# Patient Record
Sex: MALE | Race: WHITE | Employment: OTHER | URBAN - METROPOLITAN AREA
[De-identification: names, ages, dates, MRNs, and addresses within clinical notes are randomized per-mention and may not be internally consistent; named-entity substitution may affect disease eponyms.]

---

## 2017-05-05 ENCOUNTER — OFFICE VISIT (OUTPATIENT)
Dept: INTERNAL MEDICINE CLINIC | Age: 62
End: 2017-05-05

## 2017-05-05 VITALS
RESPIRATION RATE: 18 BRPM | BODY MASS INDEX: 25.33 KG/M2 | DIASTOLIC BLOOD PRESSURE: 78 MMHG | HEIGHT: 74 IN | TEMPERATURE: 97.5 F | WEIGHT: 197.38 LBS | SYSTOLIC BLOOD PRESSURE: 127 MMHG | HEART RATE: 58 BPM | OXYGEN SATURATION: 98 %

## 2017-05-05 DIAGNOSIS — Z23 ENCOUNTER FOR IMMUNIZATION: ICD-10-CM

## 2017-05-05 DIAGNOSIS — Z11.59 ENCOUNTER FOR HEPATITIS C SCREENING TEST FOR LOW RISK PATIENT: ICD-10-CM

## 2017-05-05 DIAGNOSIS — Z00.00 PREVENTATIVE HEALTH CARE: Primary | ICD-10-CM

## 2017-05-05 DIAGNOSIS — L57.8 SUN-DAMAGED SKIN: ICD-10-CM

## 2017-05-05 DIAGNOSIS — M75.82 ROTATOR CUFF TENDONITIS, LEFT: ICD-10-CM

## 2017-05-05 RX ORDER — IBUPROFEN 200 MG
400 TABLET ORAL
COMMUNITY

## 2017-05-05 NOTE — PROGRESS NOTES
[facility]  Timeout Progress Note    Chart reviewed for allergies/reaction to any medications. TIMEOUT initiated prior to start of procedure:       Yes No: yes Patient identified by name and date of birth     Yes No: yes Informed consent obtained     Yes No: yes Procedure site marked and verified     Yes No: yes Procedure to be performed verified, patient confirms understanding     Yes No: yes Pain assessment pre-procedure - Pain 0/10     Yes No: yes Pain assessment post-procedure - Pain 0/10     Yes No: yes Patient education provided     Yes No: yes Post-procedure instructions provided to patient     Consent form signed and verified. Patient tolerated procedure well.

## 2017-05-05 NOTE — MR AVS SNAPSHOT
Visit Information Date & Time Provider Department Dept. Phone Encounter #  
 5/5/2017  9:15 AM Blayne Meek MD [facility] [address] [id number] Follow-up Instructions Return in about 1 year (around 5/5/2018). Upcoming Health Maintenance Date Due Hepatitis C Screening 1955 DTaP/Tdap/Td series (1 - Tdap) 11/1/1976 ZOSTER VACCINE AGE 60> 11/1/2015 INFLUENZA AGE 9 TO ADULT 8/1/2017 COLONOSCOPY 11/25/2025 Allergies as of 5/5/2017  Review Complete On: 5/5/2017 By: Blayne Meek MD  
 No Known Allergies Current Immunizations  Reviewed on 5/5/2017 Name Date Tdap  Incomplete Reviewed by Blayne Meek MD on 5/5/2017 at  9:48 AM  
 Reviewed by Blayne Meek MD on 5/5/2017 at  9:48 AM  
You Were Diagnosed With   
  
 Codes Comments Preventative health care    -  Primary ICD-10-CM: Z00.00 ICD-9-CM: V70.0 Encounter for immunization     ICD-10-CM: L98 ICD-9-CM: V03.89 Sun-damaged skin     ICD-10-CM: L57.8 ICD-9-CM: 692.79 Rotator cuff tendonitis, left     ICD-10-CM: M75.82 ICD-9-CM: 726.10 Encounter for hepatitis C screening test for low risk patient     ICD-10-CM: Z11.59 
ICD-9-CM: V73.89 Vitals BP Pulse Temp Resp Height(growth percentile) Weight(growth percentile) 127/78 (BP 1 Location: Left arm, BP Patient Position: Sitting) (!) 58 97.5 °F (36.4 °C) (Oral) 18 6' 1.62\" (1.87 m) 197 lb 6 oz (89.5 kg) SpO2 BMI Smoking Status 98% 25.6 kg/m2 Never Smoker Vitals History BMI and BSA Data Body Mass Index Body Surface Area  
 25.6 kg/m 2 2.16 m 2 Preferred Pharmacy Pharmacy Name Phone [id number] , [address] [place] [address]  [phone] Your Updated Medication List  
  
   
This list is accurate as of: 5/5/17 10:07 AM.  Always use your most recent med list. ADVIL 200 mg tablet Generic drug:  ibuprofen Take  by mouth as needed for Pain. We Performed the Following HEPATITIS C AB [06371 CPT(R)] LIPID PANEL [08265 CPT(R)] METABOLIC PANEL, BASIC [50966 CPT(R)] [place] IMMUNIZ ADMIN,1 SINGLE/COMB VAC/TOXOID [id number] CPT(R)] PROSTATE SPECIFIC AG (PSA) [id number] CPT(R)] REFERRAL TO DERMATOLOGY [REF19 Custom] Comments:  
 . TETANUS, DIPHTHERIA TOXOIDS AND ACELLULAR PERTUSSIS VACCINE (TDAP), IN INDIVIDS. >=7, IM [id number] CPT(R)] Follow-up Instructions Return in about 1 year (around 5/5/2018). Referral Information Referral ID Referred By Referred To  
  
 [id number] Pawel LÓPEZ MD   
   [address] [place] [address] Phone: [phone] Fax: [phone] Visits Status Start Date End Date 1 New Request 5/5/17 5/5/18 If your referral has a status of pending review or denied, additional information will be sent to support the outcome of this decision. Patient Instructions Well Visit, Men 48 to 72: Care Instructions Your Care Instructions Physical exams can help you stay healthy. Your doctor has checked your overall health and may have suggested ways to take good care of yourself. He or she also may have recommended tests. At home, you can help prevent illness with healthy eating, regular exercise, and other steps. Follow-up care is a key part of your treatment and safety. Be sure to make and go to all appointments, and call your doctor if you are having problems. It's also a good idea to know your test results and keep a list of the medicines you take. How can you care for yourself at home? · Reach and stay at a healthy weight. This will lower your risk for many problems, such as obesity, diabetes, heart disease, and high blood pressure. · Get at least 30 minutes of exercise on most days of the week.  Walking is a good choice. You also may want to do other activities, such as running, swimming, cycling, or playing tennis or team sports. · Do not smoke. Smoking can make health problems worse. If you need help quitting, talk to your doctor about stop-smoking programs and medicines. These can increase your chances of quitting for good. · Protect your skin from too much sun. When you're outdoors from 10 a.m. to 4 p.m., stay in the shade or cover up with clothing and a hat with a wide brim. Wear sunglasses that block UV rays. Even when it's cloudy, put broad-spectrum sunscreen (SPF 30 or higher) on any exposed skin. · See a dentist one or two times a year for checkups and to have your teeth cleaned. · Wear a seat belt in the car. · Limit alcohol to 2 drinks a day. Too much alcohol can cause health problems. Follow your doctor's advice about when to have certain tests. These tests can spot problems early. · Cholesterol. Your doctor will tell you how often to have this done based on your overall health and other things that can increase your risk for heart attack and stroke. · Blood pressure. Have your blood pressure checked during a routine doctor visit. Your doctor will tell you how often to check your blood pressure based on your age, your blood pressure results, and other factors. · Prostate exam. Talk to your doctor about whether you should have a blood test (called a PSA test) for prostate cancer. Experts disagree on whether men should have this test. Some experts recommend that you discuss the benefits and risks of the test with your doctor. · Diabetes. Ask your doctor whether you should have tests for diabetes. · Vision. Some experts recommend that you have yearly exams for glaucoma and other age-related eye problems starting at age 48. · Hearing. Tell your doctor if you notice any change in your hearing. You can have tests to find out how well you hear. · Colon cancer. You should begin tests for colon cancer at age 48. You may have one of several tests. Your doctor will tell you how often to have tests based on your age and risk. Risks include whether you already had a precancerous polyp removed from your colon or whether your parent, brother, sister, or child has had colon cancer. · Heart attack and stroke risk. At least every 4 to 6 years, you should have your risk for heart attack and stroke assessed. Your doctor uses factors such as your age, blood pressure, cholesterol, and whether you smoke or have diabetes to show what your risk for a heart attack or stroke is over the next 10 years. · Abdominal aortic aneurysm. Ask your doctor whether you should have a test to check for an aneurysm. You may need a test if you ever smoked or if your parent, brother, sister, or child has had an aneurysm. When should you call for help? Watch closely for changes in your health, and be sure to contact your doctor if you have any problems or symptoms that concern you. Where can you learn more? Go to http://alo-sukhjinder.info/. Enter Y379 in the search box to learn more about \"Well Visit, Men 48 to 72: Care Instructions. \" Current as of: July 19, 2016 Content Version: 11.2 © 3233-6140 [employer]. Care instructions adapted under license by [employer] (which disclaims liability or warranty for this information). If you have questions about a medical condition or this instruction, always ask your healthcare professional. [place] [address] any warranty or liability for your use of this information. Well Visit, Men 48 to 72: Care Instructions Your Care Instructions Physical exams can help you stay healthy. Your doctor has checked your overall health and may have suggested ways to take good care of yourself. He or she also may have recommended tests.  At home, you can help prevent illness with healthy eating, regular exercise, and other steps. Follow-up care is a key part of your treatment and safety. Be sure to make and go to all appointments, and call your doctor if you are having problems. It's also a good idea to know your test results and keep a list of the medicines you take. How can you care for yourself at home? · Reach and stay at a healthy weight. This will lower your risk for many problems, such as obesity, diabetes, heart disease, and high blood pressure. · Get at least 30 minutes of exercise on most days of the week. Walking is a good choice. You also may want to do other activities, such as running, swimming, cycling, or playing tennis or team sports. · Do not smoke. Smoking can make health problems worse. If you need help quitting, talk to your doctor about stop-smoking programs and medicines. These can increase your chances of quitting for good. · Protect your skin from too much sun. When you're outdoors from 10 a.m. to 4 p.m., stay in the shade or cover up with clothing and a hat with a wide brim. Wear sunglasses that block UV rays. Even when it's cloudy, put broad-spectrum sunscreen (SPF 30 or higher) on any exposed skin. · See a dentist one or two times a year for checkups and to have your teeth cleaned. · Wear a seat belt in the car. · Limit alcohol to 2 drinks a day. Too much alcohol can cause health problems. Follow your doctor's advice about when to have certain tests. These tests can spot problems early. · Cholesterol. Your doctor will tell you how often to have this done based on your overall health and other things that can increase your risk for heart attack and stroke. · Blood pressure. Have your blood pressure checked during a routine doctor visit. Your doctor will tell you how often to check your blood pressure based on your age, your blood pressure results, and other factors.  
· Prostate exam. Talk to your doctor about whether you should have a blood test (called a PSA test) for prostate cancer. Experts disagree on whether men should have this test. Some experts recommend that you discuss the benefits and risks of the test with your doctor. · Diabetes. Ask your doctor whether you should have tests for diabetes. · Vision. Some experts recommend that you have yearly exams for glaucoma and other age-related eye problems starting at age 48. · Hearing. Tell your doctor if you notice any change in your hearing. You can have tests to find out how well you hear. · Colon cancer. You should begin tests for colon cancer at age 48. You may have one of several tests. Your doctor will tell you how often to have tests based on your age and risk. Risks include whether you already had a precancerous polyp removed from your colon or whether your parent, brother, sister, or child has had colon cancer. · Heart attack and stroke risk. At least every 4 to 6 years, you should have your risk for heart attack and stroke assessed. Your doctor uses factors such as your age, blood pressure, cholesterol, and whether you smoke or have diabetes to show what your risk for a heart attack or stroke is over the next 10 years. · Abdominal aortic aneurysm. Ask your doctor whether you should have a test to check for an aneurysm. You may need a test if you ever smoked or if your parent, brother, sister, or child has had an aneurysm. When should you call for help? Watch closely for changes in your health, and be sure to contact your doctor if you have any problems or symptoms that concern you. Where can you learn more? Go to http://alo-sukhjinder.info/. Enter P437 in the search box to learn more about \"Well Visit, Men 48 to 72: Care Instructions. \" Current as of: July 19, 2016 Content Version: 11.2 © 5055-0964 [employer].  Care instructions adapted under license by [employer] (which disclaims liability or warranty for this information). If you have questions about a medical condition or this instruction, always ask your healthcare professional. [address] any warranty or liability for your use of this information. Introducing [facility] SERVICES! [facility] introduces [employer] patient portal. Now you can access parts of your medical record, email your doctor's office, and request medication refills online. 1. In your internet browser, go to [email or web address]://[email or web address]. [employer] 2. Click on the First Time User? Click Here link in the Sign In box. You will see the New Member Sign Up page. 3. Enter your [employer] Access Code exactly as it appears below. You will not need to use this code after youve completed the sign-up process. If you do not sign up before the expiration date, you must request a new code. · [employer] Access Code: [email or web address] Expires: 8/3/2017 10:07 AM 
 
4. Enter the last four digits of your Social Security Number (xxxx) and Date of Birth (mm/dd/yyyy) as indicated and click Submit. You will be taken to the next sign-up page. 5. Create a [employer] ID. This will be your [employer] login ID and cannot be changed, so think of one that is secure and easy to remember. 6. Create a [employer] password. You can change your password at any time. 7. Enter your Password Reset Question and Answer. This can be used at a later time if you forget your password. 8. Enter your e-mail address. You will receive e-mail notification when new information is available in [address] 9. Click Sign Up. You can now view and download portions of your medical record. 10. Click the Download Summary menu link to download a portable copy of your medical information. If you have questions, please visit the Frequently Asked Questions section of the [employer] website. Remember, [employer] is NOT to be used for urgent needs. For medical emergencies, dial 911. Now available from your iPhone and Android! Please provide this summary of care documentation to your next provider. Your primary care clinician is listed as Christianne Kong. If you have any questions after today's visit, please call [phone].

## 2017-05-05 NOTE — PROGRESS NOTES
HISTORY OF PRESENT ILLNESS  Sera Sy is a 64 y.o. male. HPI  Sera Sy is here for complete health maintenance physical exam and screening. he does have other concerns. Problem visit:  Sera Sy is here for complaint of L shoulder pain. Problem began 5 month(s) ago. Severity is mild  Character of problem: slid down walking down wet muddy hill playing golf and landed on L side. Reaching up/ back makes the problem worse. nothing makes the problem better. Associated symptoms include: no swelling, weakness  Treatments tried include: medication not used        Health maintenance hx includes:  Exercise: very active. Form of exercise: running   Diet: generally follows a low fat low cholesterol diet, generally follows a low sodium diet, exercises regularly, nonsmoker  retired  Cancer screening:    Colon cancer screening:  Last Colonoscopy: 2015 and was normal   Prostate cancer screening: PSA and/or ELÍAS:   Lab Results   Component Value Date/Time    Prostate Specific Ag 1.1 03/24/2015 11:07 AM          Lab Results   Component Value Date/Time    Cholesterol, total 223 03/24/2015 11:07 AM    HDL Cholesterol 89 03/24/2015 11:07 AM    LDL, calculated 125 03/24/2015 11:07 AM    VLDL, calculated 9 03/24/2015 11:07 AM    Triglyceride 43 03/24/2015 11:07 AM       Lab Results   Component Value Date/Time    Glucose 92 03/24/2015 11:07 AM         Immunizations:     Immunization History   Administered Date(s) Administered    Tdap 05/05/2017      Immunization status: tdap due       Social History     Social History    Marital status:      Spouse name: N/A    Number of children: N/A    Years of education: N/A     Occupational History    Not on file.      Social History Main Topics    Smoking status: Never Smoker    Smokeless tobacco: Not on file    Alcohol use 1.0 - 1.5 oz/week     2 - 3 Standard drinks or equivalent per week    Drug use: No    Sexual activity: Yes     Partners: Female     Other Topics Concern    Not on file     Social History Narrative     Past Surgical History:   Procedure Laterality Date    HX APPENDECTOMY      in 4th grade    HX HEENT Left     opthalmoplegia - repairs    HX ORTHOPAEDIC  1978    R meniscus surgery    HX ORTHOPAEDIC  2015    Trigger Finger - Left     HX OTHER SURGICAL      Multiple eye surgery     Family History   Problem Relation Age of Onset    Sleep Apnea Mother     Sleep Apnea Brother     Cancer Neg Hx     Diabetes Neg Hx     Hypertension Neg Hx      No current outpatient prescriptions on file prior to visit. No current facility-administered medications on file prior to visit. .    Review of Systems   Constitutional: Negative for malaise/fatigue and weight loss. Eyes: Negative for blurred vision and pain. Respiratory: Negative for cough, shortness of breath and wheezing. Cardiovascular: Negative for chest pain, palpitations and leg swelling. Gastrointestinal: Negative for abdominal pain, blood in stool, constipation, diarrhea, heartburn, nausea and vomiting. Genitourinary: Negative. Musculoskeletal: Positive for joint pain. Negative for back pain and myalgias. Skin: Negative for rash. Neurological: Negative for dizziness and headaches. Endo/Heme/Allergies: Negative for environmental allergies. Does not bruise/bleed easily. Psychiatric/Behavioral: Negative for depression. The patient is not nervous/anxious and does not have insomnia. Physical Exam   Constitutional: He is oriented to person, place, and time. He appears well-developed and well-nourished. No distress. Body mass index is 25.6 kg/(m^2). /78 (BP 1 Location: Left arm, BP Patient Position: Sitting)  Pulse (!) 58  Temp 97.5 °F (36.4 °C) (Oral)   Resp 18  Ht 6' 1.62\" (1.87 m)  Wt 197 lb 6 oz (89.5 kg)  SpO2 98%  BMI 25.6 kg/m2   HENT:   Head: Normocephalic and atraumatic.    Right Ear: Hearing, tympanic membrane and ear canal normal.   Left Ear: Hearing, tympanic membrane and ear canal normal.   Nose: Nose normal.   Mouth/Throat: Oropharynx is clear and moist and mucous membranes are normal. Normal dentition. Eyes: Conjunctivae and lids are normal. Pupils are equal, round, and reactive to light. Right eye exhibits no discharge. Left eye exhibits no discharge. No scleral icterus. Neck: Trachea normal. No thyromegaly present. Cardiovascular: Normal rate, regular rhythm, normal heart sounds, intact distal pulses and normal pulses. Exam reveals no gallop and no friction rub. No murmur heard. Pulmonary/Chest: Effort normal and breath sounds normal. No respiratory distress. Abdominal: Soft. Normal appearance and bowel sounds are normal. He exhibits no distension and no mass. There is no hepatosplenomegaly. There is no tenderness. There is no CVA tenderness. Musculoskeletal: Normal range of motion. He exhibits no edema or tenderness. left shoulder exam:    ROM: full range of motion  Crepitus: NO  Shoulder  is not tender to palpation   NEER test: negative  Daily test: negative  Cross over test:  positive  Empty Can test:  negative  Stressed ext rotation:  positive  Stressed int rotation:  negative  Bicep tendon: non-tender     Lymphadenopathy:     He has no cervical adenopathy. Right: No inguinal adenopathy present. Left: No inguinal adenopathy present. Neurological: He is alert and oriented to person, place, and time. Skin: Skin is warm and dry. No rash noted. He is not diaphoretic. Moderate sun damaged skin with typical freckling, scattered nonsuspicous nevi over sun exposed areas. Several small hard whitish lesions on calfs. Psychiatric: He has a normal mood and affect. His speech is normal and behavior is normal. Judgment and thought content normal. Cognition and memory are normal.   Nursing note and vitals reviewed. ASSESSMENT and PLAN  Charly Cain was seen today for well male.     Diagnoses and all orders for this visit:    Preventative health care  -     LIPID PANEL  -     METABOLIC PANEL, [address]  [facility] on age-appropriate/ guideline-based risk prevention behaviors and screening for a 64y.o. year old   male . We also discussed adjustments in screening based on family history if necessary. Printed instructions for preventative screening guidelines and healthy behaviors given to patient with after visit summary. Encounter for immunization  -     Tetanus, diphtheria toxoids and acellular pertussis (TDAP) vaccine, in individuals >=7 years, IM  -     ID IMMUNIZ ADMIN,1 SINGLE/COMB VAC/TOXOID    Sun-damaged skin  -     REFERRAL TO DERMATOLOGY    Rotator cuff tendonitis, left -   -     REFERRAL TO PHYSICAL THERAPY    Encounter for hepatitis C screening test for low risk patient  -     HEPATITIS C AB      Follow-up Disposition:  Return in about 1 year (around 5/5/2018).

## 2017-05-05 NOTE — PATIENT INSTRUCTIONS

## 2017-05-06 LAB
BUN SERPL-MCNC: 13 MG/DL (ref 8–27)
BUN/CREAT SERPL: 14 (ref 10–24)
CALCIUM SERPL-MCNC: 9.1 MG/DL (ref 8.6–10.2)
CHLORIDE SERPL-SCNC: 101 MMOL/L (ref 96–106)
CHOLEST SERPL-MCNC: 213 MG/DL (ref 100–199)
CO2 SERPL-SCNC: 27 MMOL/L (ref 18–29)
CREAT SERPL-MCNC: 0.95 MG/DL (ref 0.76–1.27)
GLUCOSE SERPL-MCNC: 90 MG/DL (ref 65–99)
HCV AB S/CO SERPL IA: 0.1 S/CO RATIO (ref 0–0.9)
HDLC SERPL-MCNC: 80 MG/DL
INTERPRETATION, 910389: NORMAL
LDLC SERPL CALC-MCNC: 122 MG/DL (ref 0–99)
POTASSIUM SERPL-SCNC: 4.7 MMOL/L (ref 3.5–5.2)
PSA SERPL-MCNC: 1.2 NG/ML (ref 0–4)
SODIUM SERPL-SCNC: 142 MMOL/L (ref 134–144)
TRIGL SERPL-MCNC: 56 MG/DL (ref 0–149)
VLDLC SERPL CALC-MCNC: 11 MG/DL (ref 5–40)

## 2017-05-17 ENCOUNTER — HOSPITAL ENCOUNTER (OUTPATIENT)
Dept: PHYSICAL THERAPY | Age: 62
Discharge: HOME | End: 2017-05-17
Payer: COMMERCIAL

## 2017-05-17 PROCEDURE — 97110 THERAPEUTIC EXERCISES: CPT | Performed by: PHYSICAL THERAPIST

## 2017-05-17 PROCEDURE — 97161 PT EVAL LOW COMPLEX 20 MIN: CPT | Performed by: PHYSICAL THERAPIST

## 2017-05-17 NOTE — PROGRESS NOTES
PT INITIAL EVALUATION NOTE 2-15    Patient Name: Paula Dorado  Date:2017  : 1955  [x]  Patient  Verified  Payor: Demario Jasmine / Plan: Hilary Diehl [employer] HMO / Product Type: HMO /    In time:2:05 pm  Out time:3:10 pm  Total Treatment Time (min): 65  Visit #: 1     Treatment Area: Left shoulder pain [M25.512]    SUBJECTIVE  Pain Level (0-10 scale): 2-310  Any medication changes, allergies to medications, adverse drug reactions, diagnosis change, or new procedure performed?: [] No    [x] Yes (see summary sheet for update)  Subjective:     Pt reports that he was walking on a golf course when playing and slipped and fell onto his left shoulder.   PLOF: ***  Mechanism of Injury: ***  Previous Treatment/Compliance: ***  PMHx/Surgical Hx: ***  Work Hx: ***  Living Situation: ***  Pt Goals: ***  Barriers: ***  Motivation: ***  Substance use: ***   FABQ Score: ***  Cognition: A & O x ***        OBJECTIVE/EXAMINATION  Posture:  Bilateral downsloping shoulders with R shoulder lower than L  Other Observations:  Pt is fit and is tall ectomorphic body type  Functional  and Pinch:  ***  Palpation: ***      AROM Shoulder:         R   L  Flexion    132   126  Extension   42   32  Abd/RD   130   126  IR    To stomach  To stomach  ER    52   31      UPPER QUARTER   MUSCLE STRENGTH  KEY       R  L  0 - No Contraction  C1, C2 Neck Flex ***  ***  1 - Trace   C3 Side Flex  ***  ***  2 - Poor   C4 Sh Elev  ***  ***  3 - Fair    C5 Deltoid/Biceps ***  ***  4 - Good   C6 Wrist Ext  ***  ***  5 - Normal   C7 Triceps  ***  ***      C8 Thumb Ext  ***  ***      T1 Hand Inst  ***  ***    MMT: ***  Neurological: Reflexes / Sensations: ***  Special Tests: ***      Modality rationale: {[facility] INMOTION MODALITIES:60602} to improve the patients ability to ***   Min Type Additional Details    [] Estim: []Att   []Unatt        []TENS instruct                  []IFC  []Premod   []NMES                     []Other:  []w/US []w/ice   []w/heat  Position:  Location:    []  Traction: [] Cervical       []Lumbar                       [] Prone          []Supine                       []Intermittent   []Continuous Lbs:  [] before manual  [] after manual  []w/heat    []  Ultrasound: []Continuous   [] Pulsed at:                            []1MHz   []3MHz Location:  W/cm2:    []  Paraffin         Location:  []w/heat    []  Ice     []  Heat  []  Ice massage Position:  Location:    []  Laser  []  Other: Position:  Location:    []  Vasopneumatic Device Pressure:       [] lo [] med [] hi   Temperature:    [x] Skin assessment post-treatment:  [x]intact []redness- no adverse reaction    []redness  adverse reaction:     15 min Therapeutic Exercise:  [x] See flow sheet :   Rationale: increase ROM, increase strength, improve coordination, improve balance and increase proprioception to improve the patients ability to return to N ADL skills            With   [x] TE   [] TA   [] neuro   [] other: Patient Education: [x] Review HEP    [] Progressed/Changed HEP based on:   [] positioning   [] body mechanics   [] transfers   [] heat/ice application    [] other:        Other Objective/Functional Measures: See FOTO scanned into chart    Pain Level (0-10 scale) post treatment: ***      ASSESSMENT:      [x]  See Plan of Care      Herminia Mcwilliams PT, DPT, [facility] 5/17/2017  2:14 PM

## 2017-05-17 NOTE — PROGRESS NOTES
[address] [address], [place], [place] [address]  Phone: [phone]  Fax: [phone]    Plan of Care/ Statement of Necessity for Physical Therapy Services 2-15    Patient name: Carlos Meredith  : 1955  Provider#: [id number]  Referral source: Lucio Moreno MD      Medical/Treatment Diagnosis: Left shoulder pain [M25.512]     Prior Hospitalization: see medical history     Comorbidities: ***  Prior Level of Function: ***  Medications: Verified on Patient Summary List    Start of Care: ***      Onset Date: ***       The Plan of Care and following information is based on the information from the initial evaluation. Assessment/ key information: ***    Evaluation Complexity History {PT OP History :91978}; Examination {PT OP Examination :76216} ; Presentation {PT OP Presentation :32164} ; Clinical Decision Making {PT OP Decision Making :65400}  Overall Complexity Rating: {PT OP Complexity:39185}    Problem List: {[facility] IP PROBLEM LIST:11664}   Treatment Plan may include any combination of the following: {Outpt PT Treatment UEOR:68805}  Patient / Family readiness to learn indicated by: {Outpt PT Patient Family Readiness to Learn:}  Persons(s) to be included in education: {IM Persons Education:33643}  Barriers to Learning/Limitations: {barriers to learning/limitations:58154}  Patient Goal (s): ***  Patient Self Reported Health Status: {Outpt PT Rehabilitation Potential:96437}  Rehabilitation Potential: {Outpt PT Rehabilitation Potential:12160}    Short Term Goals: To be accomplished in *** {[facility] OP WEEKS/TREATMENTS:}:  ***  Long Term Goals: To be accomplished in *** {[facility] OP WEEKS/TREATMENTS:}:  ***  Frequency / Duration: Patient to be seen *** times per week for *** {[facility] OP WEEKS/TREATMENTS:}.     Patient/ Caregiver education and instruction: self care, activity modification, brace/ splint application and exercises    [x]  Plan of care has been reviewed with JASBIR Gramajo, PT, DPT, [facility] 5/17/2017 2:12 PM    ________________________________________________________________________    I certify that the above Therapy Services are being furnished while the patient is under my care. I agree with the treatment plan and certify that this therapy is necessary.     [de-identified] Signature:____________________  Date:____________Time: _________

## 2017-05-31 ENCOUNTER — APPOINTMENT (OUTPATIENT)
Dept: PHYSICAL THERAPY | Age: 62
End: 2017-05-31
Payer: COMMERCIAL

## 2017-06-06 ENCOUNTER — HOSPITAL ENCOUNTER (OUTPATIENT)
Dept: PHYSICAL THERAPY | Age: 62
Discharge: HOME | End: 2017-06-06
Payer: COMMERCIAL

## 2017-06-06 PROCEDURE — 97110 THERAPEUTIC EXERCISES: CPT | Performed by: PHYSICAL THERAPIST

## 2017-06-06 NOTE — PROGRESS NOTES
PT DAILY TREATMENT NOTE 2-15    Patient Name: Handy Shoulder  Date:2017  : 1955  [x]  Patient  Verified  Payor: Raghu Rivero / Plan: Hilary Diehl 77 HMO / Product Type: HMO /    In time:***  Out time:***  Total Treatment Time (min): ***  Visit #: ***     Treatment Area: Left shoulder pain [M25.512]    SUBJECTIVE  Pain Level (0-10 scale): ***  Any medication changes, allergies to medications, adverse drug reactions, diagnosis change, or new procedure performed?: [x] No    [] Yes (see summary sheet for update)  Subjective functional status/changes:   [] No changes reported  ***    OBJECTIVE    Modality rationale: {[facility] INMOTION MODALITIES:80914} to improve the patients ability to ***   Min Type Additional Details    [] Estim: []Att   []Unatt        []TENS instruct                  []IFC  []Premod   []NMES                     []Other:  []w/US   []w/ice   []w/heat  Position:  Location:    []  Traction: [] Cervical       []Lumbar                       [] Prone          []Supine                       []Intermittent   []Continuous Lbs:  [] before manual  [] after manual  []w/heat    []  Ultrasound: []Continuous   [] Pulsed at:                            []1MHz   []3MHz Location:  W/cm2:    []  Paraffin         Location:  []w/heat    []  Ice     []  Heat  []  Ice massage Position:  Location:    []  Laser  []  Other: Position:  Location:    []  Vasopneumatic Device Pressure:       [] lo [] med [] hi   Temperature:    [x] Skin assessment post-treatment:  [x]intact []redness- no adverse reaction    []redness  adverse reaction:     *** min Therapeutic Exercise:  [] See flow sheet :   Rationale: {[facility] IMMOTION THER EX:81046} to improve the patients ability to ***    *** min Therapeutic Activity:  []  See flow sheet :   Rationale: {[facility] IMMOTION THER EX:05530}  to improve the patients ability to ***     *** min Neuromuscular Re-education:  []  See flow sheet :   Rationale: {[facility] IMMOTION THER EX:68026}  to improve the patients ability to ***    *** min Manual Therapy:  ***   Rationale: {[facility] IMMOTION MANUAL THERAPY:62113}  to improve the patients ability to ***    *** min Gait Training:  ___ feet with ___ device on level surfaces with ___ level of assist   Rationale: {[facility] IMMOTION THER EX:85567}  to improve the patients ability to ***          With   [] TE   [] TA   [] neuro   [] other: Patient Education: [x] Review HEP    [] Progressed/Changed HEP based on:   [] positioning   [] body mechanics   [] transfers   [] heat/ice application    [] other:      Other Objective/Functional Measures: ***     Pain Level (0-10 scale) post treatment: ***    ASSESSMENT/Changes in Function:     Patient will continue to benefit from skilled PT services to {[facility] INMOTION ASSESSMENT STATEMENTS:20159} to attain remaining goals.      []  See Plan of Care  []  See progress note/recertification  []  See Discharge Summary         Progress towards goals / Updated goals:  ***    PLAN  []  Upgrade activities as tolerated     []  Continue plan of care  []  Update interventions per flow sheet       []  Discharge due to:_  []  Other:_      Barbara Cabrera, PT 6/6/2017  5:35 PM

## 2017-06-12 ENCOUNTER — APPOINTMENT (OUTPATIENT)
Dept: PHYSICAL THERAPY | Age: 62
End: 2017-06-12
Payer: COMMERCIAL

## 2017-06-27 ENCOUNTER — APPOINTMENT (OUTPATIENT)
Dept: PHYSICAL THERAPY | Age: 62
End: 2017-06-27
Payer: COMMERCIAL

## 2017-07-03 ENCOUNTER — HOSPITAL ENCOUNTER (OUTPATIENT)
Dept: PHYSICAL THERAPY | Age: 62
Discharge: HOME | End: 2017-07-03
Payer: COMMERCIAL

## 2017-07-03 PROCEDURE — 97140 MANUAL THERAPY 1/> REGIONS: CPT | Performed by: PHYSICAL THERAPIST

## 2017-07-03 PROCEDURE — 97110 THERAPEUTIC EXERCISES: CPT | Performed by: PHYSICAL THERAPIST

## 2017-07-03 NOTE — ANCILLARY DISCHARGE INSTRUCTIONS
[address] [facility] [address]  Phone: [phone]  Fax: [phone]    Discharge Summary  2-15    Patient name: Charli Slater  : 1955  Provider#: [id number]  Referral source: Gustavo Clay MD      Medical/Treatment Diagnosis: Left shoulder pain [M25.512]     Prior Hospitalization: see medical history     Comorbidities: See Plan of Care  Prior Level of Function:See Plan of Care  Medications: Verified on Patient Summary List    Start of Care: 2017     Onset Date:2016   Visits from Start of Care: 3     Missed Visits: 0  Reporting Period : 2017 to 7/3/2017      ASSESSMENT/SUMMARY OF CARE: Pt has progressed well with HEP and with demonstsration of ADL skills without compensation or pain present. Pt will benefit from continued demonstration and completion of his HEP and will f/u with PT PRN. Goal: Pt will demo AROM bilateral shoulder WNL to allow ADL performance without compensation  Status at last note/certification:  Status at discharge: met    Goal:Pt will demo seated posture with no v.c. And no pain present.   Status at last note/certification:  Status at discharge: met    Goal: Pt will demo all work duties, all exercise performance and all ADL/IADL skills without compensation  Status at last note/certification:  Status at discharge: met        RECOMMENDATIONS:  [x]Discontinue therapy: [x]Patient has reached or is progressing toward set goals      []Patient is non-compliant or has abdicated      []Due to lack of appreciable progress towards set goals      []Other    Rafael Guzmán, PT, DPT, [facility]  7/3/2017  11:48 AM

## 2017-07-03 NOTE — PROGRESS NOTES
PT DAILY TREATMENT NOTE 2-15    Patient Name: Fabricio Doyle  Date:7/3/2017  : 1955  [x]  Patient  Verified  Payor: [employer] [facility] [employer] / Plan: Hilary Wilkes HMO / Product Type: HMO /    In time:11:05 am  Out time:11:50 AM  Total Treatment Time (min): 45  Visit #: 3     Treatment Area: Left shoulder pain [M25.512]    SUBJECTIVE  Pain Level (0-10 scale): 2-4/10  Any medication changes, allergies to medications, adverse drug reactions, diagnosis change, or new procedure performed?: [x] No    [] Yes (see summary sheet for update)  Subjective functional status/changes:   [] No changes reported  Pt reports that he has been getting to his HEP about 3-4 times per week. He has been doing generalized exercise like biking, running and gym working out. He reports that his pain is greatest when he is sleeping on his shoulder. OBJECTIVE    30 min Therapeutic Exercise:  [x] See flow sheet :   Rationale: increase ROM, increase strength, improve coordination, improve balance and increase proprioception to improve the patients ability to return to N ADL skills and IADL skills    15 min Manual Therapy:  A/P and Inf glides L gh jt gd III and IV; TFM to left supraspinatus attachment   Rationale: decrease pain, increase ROM and decrease trigger points  to improve the patients ability to reach overhead without impingement pain          With   [x] TE   [] TA   [] neuro   [] other: Patient Education: [x] Review HEP    [x] Progressed/Changed HEP based on:   [] positioning   [] body mechanics   [] transfers   [] heat/ice application    [] other:      Other Objective/Functional Measures: AROM bilateral shoulder WNL bilaterally with min end range pain left shoulder     Pain Level (0-10 scale) post treatment: 3/10 with activity.     ASSESSMENT/Changes in Function:     Patient will continue to benefit from skilled PT services to modify and progress therapeutic interventions, address functional mobility deficits, address ROM deficits, address strength deficits, analyze and address soft tissue restrictions, analyze and cue movement patterns, analyze and modify body mechanics/ergonomics, assess and modify postural abnormalities, address imbalance/dizziness and instruct in home and community integration to attain remaining goals. []  See Plan of Care  []  See progress note/recertification  []  See Discharge Summary         Progress towards goals / Updated goals:  Pt has progressed with HEP and is able to do most ADL skills without limitation.     PLAN  []  Upgrade activities as tolerated     [x]  Continue plan of care  []  Update interventions per flow sheet       [x]  Discharge due to: independence with HEP  []  Other:_      Lilli Vernon, PT, DPT, [facility]  7/3/2017  11:09 AM

## 2018-03-09 ENCOUNTER — TELEPHONE (OUTPATIENT)
Dept: INTERNAL MEDICINE CLINIC | Age: 63
End: 2018-03-09

## 2018-03-09 NOTE — TELEPHONE ENCOUNTER
Ref Request :        Dr Jacquelyn Brown at [id number] [address] - we ref'd patient to Dr Pilar Duffy who then sent patient to Dr Amy Garcia , biopsy showed squamous cell carcinoma     Apt: 04/13/2018 at 10:30am  Phone [phone] fax : [phone] atn: Tameka Giles.   Patient states he's going to need multiple visit authorized            Sinan Rodriguez [phone]

## 2018-03-15 NOTE — TELEPHONE ENCOUNTER
Per patient's insurance no referrals required. [employer] on file @ time of request.   Reached out to specialist office to advise.  Spoke with Srikanth

## 2018-05-08 ENCOUNTER — OFFICE VISIT (OUTPATIENT)
Dept: INTERNAL MEDICINE CLINIC | Age: 63
End: 2018-05-08

## 2018-05-08 VITALS
HEIGHT: 74 IN | WEIGHT: 194.5 LBS | BODY MASS INDEX: 24.96 KG/M2 | TEMPERATURE: 98.7 F | RESPIRATION RATE: 18 BRPM | OXYGEN SATURATION: 99 % | DIASTOLIC BLOOD PRESSURE: 74 MMHG | SYSTOLIC BLOOD PRESSURE: 134 MMHG | HEART RATE: 64 BPM

## 2018-05-08 DIAGNOSIS — Z00.00 PREVENTATIVE HEALTH CARE: Primary | ICD-10-CM

## 2018-05-08 RX ORDER — LORATADINE 10 MG/1
10 TABLET ORAL AS NEEDED
COMMUNITY
End: 2018-05-08

## 2018-05-08 NOTE — PROGRESS NOTES
HISTORY OF PRESENT ILLNESS  Manda Meckel is a 58 y.o. male. HPI  Manda Meckel is here for complete health maintenance physical exam and screening. he does not have other concerns. Health maintenance hx includes:  Exercise: very active. Form of exercise: running, wts, golf   Diet: generally follows a low fat low cholesterol diet, exercises regularly, nonsmoker  [facility]  for [place] [address] screening:    Colon cancer screening:  Last Colonoscopy: 2015 and was normal   Prostate cancer screening: PSA and/or ELÍAS:   Lab Results   Component Value Date/Time    Prostate Specific Ag 1.2 05/05/2017 10:43 AM    Prostate Specific Ag 1.1 03/24/2015 11:07 AM          Lab Results   Component Value Date/Time    Cholesterol, total 213 (H) 05/05/2017 10:43 AM    HDL Cholesterol 80 05/05/2017 10:43 AM    LDL, calculated 122 (H) 05/05/2017 10:43 AM    VLDL, calculated 11 05/05/2017 10:43 AM    Triglyceride 56 05/05/2017 10:43 AM       Lab Results   Component Value Date/Time    Glucose 90 05/05/2017 10:43 AM         Immunizations:     Immunization History   Administered Date(s) Administered    Tdap 05/05/2017      Immunization status: up to date and documented. Social History     Social History    Marital status:      Spouse name: N/A    Number of children: N/A    Years of education: N/A     Occupational History    Not on file.      Social History Main Topics    Smoking status: Never Smoker    Smokeless tobacco: Never Used    Alcohol use 3.0 - 3.6 oz/week     2 Glasses of wine, 1 Cans of beer, 2 - 3 Standard drinks or equivalent per week    Drug use: No    Sexual activity: Yes     Partners: Female     Other Topics Concern    Not on file     Social History Narrative     Past Surgical History:   Procedure Laterality Date    HX APPENDECTOMY      in 4th grade    HX HEENT Left     opthalmoplegia - repairs    HX ORTHOPAEDIC  1978    R meniscus surgery    HX ORTHOPAEDIC  2015    Trigger Finger - Left     HX OTHER SURGICAL      Multiple eye surgery     Family History   Problem Relation Age of Onset    Sleep Apnea Mother     Heart Disease Father     Sleep Apnea Brother     Cancer Neg Hx     Diabetes Neg Hx     Hypertension Neg Hx      Current Outpatient Prescriptions on File Prior to Visit   Medication Sig Dispense Refill    ibuprofen (ADVIL) 200 mg tablet Take  by mouth as needed for Pain. No current facility-administered medications on file prior to visit. .    Review of Systems   Constitutional: Negative for malaise/fatigue and weight loss. Eyes: Negative for blurred vision and pain. Respiratory: Negative for cough, shortness of breath and wheezing. Cardiovascular: Negative for chest pain, palpitations and leg swelling. Gastrointestinal: Negative for blood in stool, constipation, diarrhea, heartburn, nausea and vomiting. Genitourinary: Negative. Musculoskeletal: Negative for back pain, joint pain and myalgias. Skin: Negative for rash. Sun damaged skin with actinic keratosis and R thigh BCC followed by DR. Jatin Baltazar. Planning Mohs on R thigh with plastic surgeon. ? Blue light treatment vs cream for face   Neurological: Negative for dizziness and headaches. Endo/Heme/Allergies: Positive for environmental allergies (recent congestion - took claritin 2 days and resolved). Does not bruise/bleed easily. Psychiatric/Behavioral: Negative for depression. The patient is not nervous/anxious and does not have insomnia. Physical Exam   Constitutional: He is oriented to person, place, and time. He appears well-developed and well-nourished. No distress. Body mass index is 25.23 kg/(m^2). /74 (BP 1 Location: Left arm, BP Patient Position: Sitting)  Pulse 64  Temp 98.7 °F (37.1 °C) (Oral)   Resp 18  Ht 6' 1.62\" (1.87 m)  Wt 194 lb 8 oz (88.2 kg)  SpO2 99%  BMI 25.23 kg/m2   HENT:   Head: Normocephalic and atraumatic.    Right Ear: Hearing, tympanic membrane and ear canal normal.   Left Ear: Hearing, tympanic membrane and ear canal normal.   Nose: Nose normal.   Mouth/Throat: Oropharynx is clear and moist and mucous membranes are normal. Normal dentition. Eyes: Conjunctivae and lids are normal. Pupils are equal, round, and reactive to light. Right eye exhibits no discharge. Left eye exhibits no discharge. No scleral icterus. Neck: Trachea normal. No thyromegaly present. Cardiovascular: Normal rate, regular rhythm, normal heart sounds, intact distal pulses and normal pulses. Exam reveals no gallop and no friction rub. No murmur heard. Pulmonary/Chest: Effort normal and breath sounds normal. No respiratory distress. Abdominal: Soft. Normal appearance and bowel sounds are normal. He exhibits no distension and no mass. There is no hepatosplenomegaly. There is no tenderness. There is no CVA tenderness. Musculoskeletal: Normal range of motion. He exhibits no edema or tenderness. Lymphadenopathy:     He has no cervical adenopathy. Right: No inguinal adenopathy present. Left: No inguinal adenopathy present. Neurological: He is alert and oriented to person, place, and time. Skin: Skin is warm and dry. No rash noted. He is not diaphoretic. Diffuse freckling, sun damaged skin over upper back, chest, forearms, , with patches of dry rough erythema on face, forehead c/w actinic keratosis     Psychiatric: He has a normal mood and affect. His speech is normal and behavior is normal. Judgment and thought content normal. Cognition and memory are normal.   Nursing note and vitals reviewed. ASSESSMENT and PLAN  Diagnoses and all orders for this visit:    1. Preventative health care  -     PROSTATE SPECIFIC AG  -     LIPID PANEL  -     METABOLIC PANEL, BASIC  [facility] was counseled on age-appropriate/ guideline-based risk prevention behaviors and screening for a 58y.o. year old   male . We also discussed adjustments in screening based on family history if necessary. Printed instructions for preventative screening guidelines and healthy behaviors given to patient with after visit summary.           Follow-up Disposition: Not on File

## 2018-05-08 NOTE — PATIENT INSTRUCTIONS

## 2018-05-08 NOTE — MR AVS SNAPSHOT
[facility] 
 
 
 [address] [place] [address] Road 
[phone] Patient: Jeffery Tapia MRN: [id number] [id number] Visit Information Date & Time Provider Department Dept. Phone Encounter #  
 5/8/2018  9:15 AM Jon Gonzales MD [facility] [address] [id number] Upcoming Health Maintenance Date Due ZOSTER VACCINE AGE 60> 9/1/2015 Influenza Age 5 to Adult 8/1/2018 COLONOSCOPY 11/25/2025 DTaP/Tdap/Td series (2 - Td) 5/5/2027 Allergies as of 5/8/2018  Review Complete On: 5/8/2018 By: Jon Gonzales MD  
 No Known Allergies Current Immunizations  Reviewed on 5/8/2018 Name Date Tdap 5/5/2017 Reviewed by Jon Gonzales MD on 5/8/2018 at  9:55 AM  
 Reviewed by Jon Gonzales MD on 5/8/2018 at  9:55 AM  
You Were Diagnosed With   
  
 Codes Comments Preventative health care    -  Primary ICD-10-CM: Z00.00 ICD-9-CM: V70.0 Vitals BP Pulse Temp Resp Height(growth percentile) Weight(growth percentile) 134/74 (BP 1 Location: Left arm, BP Patient Position: Sitting) 64 98.7 °F (37.1 °C) (Oral) 18 6' 1.62\" (1.87 m) 194 lb 8 oz (88.2 kg) SpO2 BMI Smoking Status 99% 25.23 kg/m2 Never Smoker Vitals History BMI and BSA Data Body Mass Index Body Surface Area  
 25.23 kg/m 2 2.14 m 2 Preferred Pharmacy Pharmacy Name Phone [id number] [phone] [facility], [address] [place] [address]  [phone] Your Updated Medication List  
  
   
This list is accurate as of 5/8/18 10:08 AM.  Always use your most recent med list. ADVIL 200 mg tablet Generic drug:  ibuprofen Take  by mouth as needed for Pain. We Performed the Following LIPID PANEL [68609 CPT(R)] METABOLIC PANEL, BASIC [21472 CPT(R)] PSA, DIAGNOSTIC (PROSTATE SPECIFIC AG) [id number] CPT(R)] Patient Instructions Well Visit, Men 48 to 72: Care Instructions Your Care Instructions Physical exams can help you stay healthy. Your doctor has checked your overall health and may have suggested ways to take good care of yourself. He or she also may have recommended tests. At home, you can help prevent illness with healthy eating, regular exercise, and other steps. Follow-up care is a key part of your treatment and safety. Be sure to make and go to all appointments, and call your doctor if you are having problems. It's also a good idea to know your test results and keep a list of the medicines you take. How can you care for yourself at home? · Reach and stay at a healthy weight. This will lower your risk for many problems, such as obesity, diabetes, heart disease, and high blood pressure. · Get at least 30 minutes of exercise on most days of the week. Walking is a good choice. You also may want to do other activities, such as running, swimming, cycling, or playing tennis or team sports. · Do not smoke. Smoking can make health problems worse. If you need help quitting, talk to your doctor about stop-smoking programs and medicines. These can increase your chances of quitting for good. · Protect your skin from too much sun. When you're outdoors from 10 a.m. to 4 p.m., stay in the shade or cover up with clothing and a hat with a wide brim. Wear sunglasses that block UV rays. Even when it's cloudy, put broad-spectrum sunscreen (SPF 30 or higher) on any exposed skin. · See a dentist one or two times a year for checkups and to have your teeth cleaned. · Wear a seat belt in the car. · Limit alcohol to 2 drinks a day. Too much alcohol can cause health problems. Follow your doctor's advice about when to have certain tests. These tests can spot problems early. · Cholesterol. Your doctor will tell you how often to have this done based on your overall health and other things that can increase your risk for heart attack and stroke. · Blood pressure. Have your blood pressure checked during a routine doctor visit. Your doctor will tell you how often to check your blood pressure based on your age, your blood pressure results, and other factors. · Prostate exam. Talk to your doctor about whether you should have a blood test (called a PSA test) for prostate cancer. Experts disagree on whether men should have this test. Some experts recommend that you discuss the benefits and risks of the test with your doctor. · Diabetes. Ask your doctor whether you should have tests for diabetes. · Vision. Some experts recommend that you have yearly exams for glaucoma and other age-related eye problems starting at age 48. · Hearing. Tell your doctor if you notice any change in your hearing. You can have tests to find out how well you hear. · Colon cancer. You should begin tests for colon cancer at age 48. You may have one of several tests. Your doctor will tell you how often to have tests based on your age and risk. Risks include whether you already had a precancerous polyp removed from your colon or whether your parent, brother, sister, or child has had colon cancer. · Heart attack and stroke risk. At least every 4 to 6 years, you should have your risk for heart attack and stroke assessed. Your doctor uses factors such as your age, blood pressure, cholesterol, and whether you smoke or have diabetes to show what your risk for a heart attack or stroke is over the next 10 years. · Abdominal aortic aneurysm. Ask your doctor whether you should have a test to check for an aneurysm. You may need a test if you ever smoked or if your parent, brother, sister, or child has had an aneurysm. When should you call for help? Watch closely for changes in your health, and be sure to contact your doctor if you have any problems or symptoms that concern you. Where can you learn more? Go to http://alo-sukhjinder.info/. Enter I123 in the search box to learn more about \"Well Visit, Men 48 to 72: Care Instructions. \" Current as of: May 12, 2017 Content Version: 11.4 © 9478-4953 Healthwise, Incorporated. Care instructions adapted under license by [employer] (which disclaims liability or warranty for this information). If you have questions about a medical condition or this instruction, always ask your healthcare professional. [place] [address] any warranty or liability for your use of this information. Introducing [facility]! Chandni Romero introduces [employer] patient portal. Now you can access parts of your medical record, email your doctor's office, and request medication refills online. 1. In your internet browser, go to [email or web address] 2. Click on the First Time User? Click Here link in the Sign In box. You will see the New Member Sign Up page. 3. Enter your [employer] Access Code exactly as it appears below. You will not need to use this code after youve completed the sign-up process. If you do not sign up before the expiration date, you must request a new code. · [employer] Access Code: [email or web address] Expires: 8/6/2018 10:07 AM 
 
4. Enter the last four digits of your Social Security Number (xxxx) and Date of Birth (mm/dd/yyyy) as indicated and click Submit. You will be taken to the next sign-up page. 5. Create a [employer] ID. This will be your [employer] login ID and cannot be changed, so think of one that is secure and easy to remember. 6. Create a [employer] password. You can change your password at any time. 7. Enter your Password Reset Question and Answer. This can be used at a later time if you forget your password. 8. Enter your e-mail address. You will receive e-mail notification when new information is available in [address] 9. Click Sign Up. You can now view and download portions of your medical record. 10. Click the Download Summary menu link to download a portable copy of your medical information. If you have questions, please visit the Frequently Asked Questions section of the [employer] website. Remember, [employer] is NOT to be used for urgent needs. For medical emergencies, dial 911. Now available from your iPhone and Android! Please provide this summary of care documentation to your next provider. Your primary care clinician is listed as Montez Irving. If you have any questions after today's visit, please call [phone].

## 2018-05-09 LAB
BUN SERPL-MCNC: 15 MG/DL (ref 8–27)
BUN/CREAT SERPL: 15 (ref 10–24)
CALCIUM SERPL-MCNC: 9.6 MG/DL (ref 8.6–10.2)
CHLORIDE SERPL-SCNC: 99 MMOL/L (ref 96–106)
CHOLEST SERPL-MCNC: 224 MG/DL (ref 100–199)
CO2 SERPL-SCNC: 29 MMOL/L (ref 18–29)
CREAT SERPL-MCNC: 0.98 MG/DL (ref 0.76–1.27)
GFR SERPLBLD CREATININE-BSD FMLA CKD-EPI: 82 ML/MIN/1.73
GFR SERPLBLD CREATININE-BSD FMLA CKD-EPI: 95 ML/MIN/1.73
GLUCOSE SERPL-MCNC: 94 MG/DL (ref 65–99)
HDLC SERPL-MCNC: 79 MG/DL
INTERPRETATION, 910389: NORMAL
LDLC SERPL CALC-MCNC: 135 MG/DL (ref 0–99)
POTASSIUM SERPL-SCNC: 4.6 MMOL/L (ref 3.5–5.2)
PSA SERPL-MCNC: 1.5 NG/ML (ref 0–4)
SODIUM SERPL-SCNC: 140 MMOL/L (ref 134–144)
TRIGL SERPL-MCNC: 49 MG/DL (ref 0–149)
VLDLC SERPL CALC-MCNC: 10 MG/DL (ref 5–40)

## 2019-10-29 ENCOUNTER — OFFICE VISIT (OUTPATIENT)
Dept: INTERNAL MEDICINE CLINIC | Age: 64
End: 2019-10-29

## 2019-10-29 VITALS
HEART RATE: 54 BPM | OXYGEN SATURATION: 98 % | DIASTOLIC BLOOD PRESSURE: 78 MMHG | RESPIRATION RATE: 14 BRPM | BODY MASS INDEX: 25.54 KG/M2 | TEMPERATURE: 97.9 F | WEIGHT: 199 LBS | HEIGHT: 74 IN | SYSTOLIC BLOOD PRESSURE: 122 MMHG

## 2019-10-29 DIAGNOSIS — Z00.00 ROUTINE PHYSICAL EXAMINATION: Primary | ICD-10-CM

## 2019-10-29 DIAGNOSIS — Z23 ENCOUNTER FOR IMMUNIZATION: ICD-10-CM

## 2019-10-29 DIAGNOSIS — Z82.49 FAMILY HISTORY OF CORONARY ARTERY DISEASE: ICD-10-CM

## 2019-10-29 NOTE — PROGRESS NOTES
Declan Souza is a 61 y.o. male who was seen in clinic today (10/29/2019) for a full physical.  He is a new patient today, previously followed by Dr Tamar Whitman. He is  for [facility]. Goes by [address]. Assessment & Plan:   Diagnoses and all orders for this visit:    1. Routine physical examination  -     AMB POC EKG ROUTINE W/ 12 LEADS, INTER & REP  -     METABOLIC PANEL, COMPREHENSIVE  -     CBC W/O DIFF  -     LIPID PANEL  -     PSA, DIAGNOSTIC (PROSTATE SPECIFIC AG)    2. Encounter for immunization  -     GA IMMUNIZ ADMIN,1 SINGLE/COMB VAC/TOXOID  -     INFLUENZA VIRUS VAC QUAD,SPLIT,PRESV FREE SYRINGE IM  -     varicella-zoster recombinant, PF, (SHINGRIX, PF,) 50 mcg/0.5 mL susr injection; 0.5 ml IM once and then repeat in 2-6 months. 3. Family history of coronary artery disease- reviewed labs, reviewed pros/cons to testing below, will order to help with risk stratification   -     CT HEART W/O CONT WITH CALCIUM; Future       Weight is stable & appropriate, I have recommended the following interventions: encourage exercise and lifestyle education regarding diet.        Follow-up and Dispositions    · Follow up - 1 year for CPE (FULL PHYSICAL)         ------------------------------------------------------------------------------------------    Subjective:   Jered Merchant is here today for a full physical.      Health Maintenance  Immunizations:    Influenza: he will get this done today   Tetanus: up to date   Shingles: due for Shingrix - script provided   Pneumonia: n/a   Cancer screening:     Prostate: guidelines reviewed, will do today   Colon: guidelines reviewed, up to date      Patient Care Team:  Muna Potts MD as PCP - General (Internal Medicine)  Lidia Leach MD as PCP - [facility] Provider  Beatriz Edmond MD (Gastroenterology)  Sintia Rapp MD (Ophthalmology)  Dean Moran MD (Dermatology)  Lex Sandy MD (Orthopedic Surgery)       The following sections were reviewed & updated as appropriate: PMH, PSH, FH, and SH. Patient Active Problem List   Diagnosis Code    OA (osteoarthritis) M19.90          Prior to Admission medications    Medication Sig Start Date End Date Taking? Authorizing Provider   ibuprofen (ADVIL) 200 mg tablet Take  by mouth as needed for Pain. Yes Provider, Historical          No Known Allergies          Review of Systems   Constitutional: Negative for chills and fever. Respiratory: Negative for cough and shortness of breath. Cardiovascular: Negative for chest pain and palpitations. Gastrointestinal: Negative for abdominal pain, blood in stool, constipation, diarrhea, heartburn, nausea and vomiting. Genitourinary:        He denies: nocturia, urinary hesitancy, urinary frequency, weak stream.       Denies trouble getting or maintaining an erection. Denies trouble with AM erections. Musculoskeletal: Positive for joint pain (fingers, on/off). Negative for myalgias. Neurological: Negative for tingling, focal weakness and headaches. Endo/Heme/Allergies: Does not bruise/bleed easily. Psychiatric/Behavioral: Negative for depression. The patient is not nervous/anxious and does not have insomnia. Objective:   Physical Exam   Constitutional: He appears well-developed. No distress. HENT:   Right Ear: Tympanic membrane, external ear and ear canal normal.   Left Ear: Tympanic membrane, external ear and ear canal normal.   Nose: Nose normal.   Mouth/Throat: Uvula is midline, oropharynx is clear and moist and mucous membranes are normal. No posterior oropharyngeal erythema. Eyes: Conjunctivae and lids are normal. No scleral icterus. Neck: Neck supple. No thyromegaly present. Cardiovascular: Regular rhythm and normal heart sounds. Bradycardia present. No murmur heard. Pulses:       Dorsalis pedis pulses are 2+ on the right side, and 2+ on the left side.         Posterior tibial pulses are 2+ on the right side, and 2+ on the left side. Pulmonary/Chest: Effort normal and breath sounds normal. He has no wheezes. He has no rales. Abdominal: Soft. Bowel sounds are normal. He exhibits no mass. There is no hepatosplenomegaly. There is no tenderness. Musculoskeletal: He exhibits no edema. Cervical back: Normal.        Thoracic back: He exhibits no bony tenderness. Lumbar back: Normal.   Lymphadenopathy:     He has no cervical adenopathy. Neurological: He has normal strength. No sensory deficit. Skin: No rash noted. Psychiatric: He has a normal mood and affect. His behavior is normal.          Visit Vitals  /78   Pulse (!) 54   Temp 97.9 °F (36.6 °C) (Oral)   Resp 14   Ht 6' 1.8\" (1.875 m)   Wt 199 lb (90.3 kg)   SpO2 98%   BMI 25.69 kg/m²          Advised him to call back or return to office if symptoms worsen/change/persist.  Discussed expected course/resolution/complications of diagnosis in detail with patient. Medication risks/benefits/costs/interactions/alternatives discussed with patient. He was given an after visit summary which includes diagnoses, current medications, & vitals. He expressed understanding with the diagnosis and plan. Aspects of this note may have been generated using voice recognition software. Despite editing, there may be some syntax errors.        Kelby Edge MD

## 2019-10-29 NOTE — PROGRESS NOTES
Establish care. Not sure if has AMD.     Meagan Fraser is a 61 y.o. male  who present for routine immunizations. he  denies any symptoms , reactions or allergies that would exclude them from being immunized today. Risks and adverse reactions were discussed and the VIS was given to them. All questions were addressed. he was observed for 5 min post injection. There were no reactions observed.     Bryson Johnson RN

## 2019-10-29 NOTE — PATIENT INSTRUCTIONS
Well Visit, Men 48 to 72: Care Instructions Your Care Instructions Physical exams can help you stay healthy. Your doctor has checked your overall health and may have suggested ways to take good care of yourself. He or she also may have recommended tests. At home, you can help prevent illness with healthy eating, regular exercise, and other steps. Follow-up care is a key part of your treatment and safety. Be sure to make and go to all appointments, and call your doctor if you are having problems. It's also a good idea to know your test results and keep a list of the medicines you take. How can you care for yourself at home? · Reach and stay at a healthy weight. This will lower your risk for many problems, such as obesity, diabetes, heart disease, and high blood pressure. · Get at least 30 minutes of exercise on most days of the week. Walking is a good choice. You also may want to do other activities, such as running, swimming, cycling, or playing tennis or team sports. · Do not smoke. Smoking can make health problems worse. If you need help quitting, talk to your doctor about stop-smoking programs and medicines. These can increase your chances of quitting for good. · Protect your skin from too much sun. When you're outdoors from 10 a.m. to 4 p.m., stay in the shade or cover up with clothing and a hat with a wide brim. Wear sunglasses that block UV rays. Even when it's cloudy, put broad-spectrum sunscreen (SPF 30 or higher) on any exposed skin. · See a dentist one or two times a year for checkups and to have your teeth cleaned. · Wear a seat belt in the car. Follow your doctor's advice about when to have certain tests. These tests can spot problems early. · Cholesterol. Your doctor will tell you how often to have this done based on your overall health and other things that can increase your risk for heart attack and stroke. · Blood pressure.  Have your blood pressure checked during a routine doctor visit. Your doctor will tell you how often to check your blood pressure based on your age, your blood pressure results, and other factors. · Prostate exam. Talk to your doctor about whether you should have a blood test (called a PSA test) for prostate cancer. Experts recommend that you discuss the benefits and risks of the test with your doctor before you decide whether to have this test. 
· Diabetes. Ask your doctor whether you should have tests for diabetes. · Vision. Some experts recommend that you have yearly exams for glaucoma and other age-related eye problems starting at age 48. · Hearing. Tell your doctor if you notice any change in your hearing. You can have tests to find out how well you hear. · Colorectal cancer. Your risk for colorectal cancer gets higher as you get older. Some experts say that adults should start regular screening at age 48 and stop at age 76. Others say to start before age 48 or continue after age 76. Talk with your doctor about your risk and when to start and stop screening. · Heart attack and stroke risk. At least every 4 to 6 years, you should have your risk for heart attack and stroke assessed. Your doctor uses factors such as your age, blood pressure, cholesterol, and whether you smoke or have diabetes to show what your risk for a heart attack or stroke is over the next 10 years. · Abdominal aortic aneurysm. Ask your doctor whether you should have a test to check for an aneurysm. You may need a test if you ever smoked or if your parent, brother, sister, or child has had an aneurysm. When should you call for help? Watch closely for changes in your health, and be sure to contact your doctor if you have any problems or symptoms that concern you. Where can you learn more? Go to http://alo-sukhjinder.info/. Enter C639 in the search box to learn more about \"Well Visit, Men 48 to 72: Care Instructions. \" Current as of: December 13, 2018 Content Version: 12.2 © 1445-1630 [employer], Inc[employer]. Care instructions adapted under license by [employer] (which disclaims liability or warranty for this information). If you have questions about a medical condition or this instruction, always ask your healthcare professional. [address] any warranty or liability for your use of this information.

## 2019-10-30 LAB
ALBUMIN SERPL-MCNC: 4.5 G/DL (ref 3.6–4.8)
ALBUMIN/GLOB SERPL: 1.8 {RATIO} (ref 1.2–2.2)
ALP SERPL-CCNC: 57 IU/L (ref 39–117)
ALT SERPL-CCNC: 19 IU/L (ref 0–44)
AST SERPL-CCNC: 23 IU/L (ref 0–40)
BILIRUB SERPL-MCNC: 1.4 MG/DL (ref 0–1.2)
BUN SERPL-MCNC: 13 MG/DL (ref 8–27)
BUN/CREAT SERPL: 14 (ref 10–24)
CALCIUM SERPL-MCNC: 9.7 MG/DL (ref 8.6–10.2)
CHLORIDE SERPL-SCNC: 99 MMOL/L (ref 96–106)
CHOLEST SERPL-MCNC: 237 MG/DL (ref 100–199)
CO2 SERPL-SCNC: 24 MMOL/L (ref 20–29)
CREAT SERPL-MCNC: 0.92 MG/DL (ref 0.76–1.27)
ERYTHROCYTE [DISTWIDTH] IN BLOOD BY AUTOMATED COUNT: 12.4 % (ref 12.3–15.4)
GLOBULIN SER CALC-MCNC: 2.5 G/DL (ref 1.5–4.5)
GLUCOSE SERPL-MCNC: 89 MG/DL (ref 65–99)
HCT VFR BLD AUTO: 44.2 % (ref 37.5–51)
HDLC SERPL-MCNC: 87 MG/DL
HGB BLD-MCNC: 14.9 G/DL (ref 13–17.7)
LDLC SERPL CALC-MCNC: 139 MG/DL (ref 0–99)
MCH RBC QN AUTO: 30.8 PG (ref 26.6–33)
MCHC RBC AUTO-ENTMCNC: 33.7 G/DL (ref 31.5–35.7)
MCV RBC AUTO: 92 FL (ref 79–97)
PLATELET # BLD AUTO: 286 X10E3/UL (ref 150–450)
POTASSIUM SERPL-SCNC: 4.6 MMOL/L (ref 3.5–5.2)
PROT SERPL-MCNC: 7 G/DL (ref 6–8.5)
PSA SERPL-MCNC: 1.5 NG/ML (ref 0–4)
RBC # BLD AUTO: 4.83 X10E6/UL (ref 4.14–5.8)
SODIUM SERPL-SCNC: 140 MMOL/L (ref 134–144)
TRIGL SERPL-MCNC: 55 MG/DL (ref 0–149)
VLDLC SERPL CALC-MCNC: 11 MG/DL (ref 5–40)
WBC # BLD AUTO: 7.2 X10E3/UL (ref 3.4–10.8)

## 2019-12-09 ENCOUNTER — HOSPITAL ENCOUNTER (OUTPATIENT)
Dept: CT IMAGING | Age: 64
Discharge: HOME | End: 2019-12-09
Attending: INTERNAL MEDICINE
Payer: SELF-PAY

## 2019-12-09 DIAGNOSIS — Z82.49 FAMILY HISTORY OF CORONARY ARTERY DISEASE: ICD-10-CM

## 2019-12-09 PROCEDURE — 75571 CT HRT W/O DYE W/CA TEST: CPT

## 2019-12-09 NOTE — PROGRESS NOTES
Results reviewed. Results released via [employer]. CT scan is abnormal.  Increase Ca in the LAD, due to [place] will recommend statin.

## 2019-12-11 RX ORDER — ATORVASTATIN CALCIUM 10 MG/1
10 TABLET, FILM COATED ORAL DAILY
Qty: 90 TAB | Refills: 0 | Status: SHIPPED | OUTPATIENT
Start: 2019-12-11 | End: 2020-03-03 | Stop reason: SDUPTHER

## 2020-03-03 RX ORDER — ATORVASTATIN CALCIUM 10 MG/1
10 TABLET, FILM COATED ORAL DAILY
Qty: 90 TAB | Refills: 1 | Status: SHIPPED | OUTPATIENT
Start: 2020-03-03 | End: 2020-08-30

## 2020-08-30 RX ORDER — ATORVASTATIN CALCIUM 10 MG/1
TABLET, FILM COATED ORAL
Qty: 90 TAB | Refills: 0 | Status: SHIPPED | OUTPATIENT
Start: 2020-08-30 | End: 2020-11-29 | Stop reason: SDUPTHER

## 2020-08-30 NOTE — TELEPHONE ENCOUNTER
Future Appointments   Date Time Provider Nayeli Bertrand   11/2/2020  8:30 AM Montana Pozo MD [facility]

## 2020-10-27 ENCOUNTER — TELEPHONE (OUTPATIENT)
Dept: INTERNAL MEDICINE CLINIC | Age: 65
End: 2020-10-27

## 2020-11-02 ENCOUNTER — OFFICE VISIT (OUTPATIENT)
Dept: INTERNAL MEDICINE CLINIC | Age: 65
End: 2020-11-02
Payer: COMMERCIAL

## 2020-11-02 VITALS
WEIGHT: 195.8 LBS | HEART RATE: 67 BPM | TEMPERATURE: 97.5 F | BODY MASS INDEX: 25.13 KG/M2 | OXYGEN SATURATION: 96 % | HEIGHT: 74 IN | DIASTOLIC BLOOD PRESSURE: 66 MMHG | RESPIRATION RATE: 14 BRPM | SYSTOLIC BLOOD PRESSURE: 112 MMHG

## 2020-11-02 DIAGNOSIS — Z71.89 ADVANCED CARE PLANNING/COUNSELING DISCUSSION: ICD-10-CM

## 2020-11-02 DIAGNOSIS — Z71.89 EDUCATED ABOUT COVID-19 VIRUS INFECTION: ICD-10-CM

## 2020-11-02 DIAGNOSIS — H61.23 BILATERAL IMPACTED CERUMEN: ICD-10-CM

## 2020-11-02 DIAGNOSIS — Z00.00 ROUTINE PHYSICAL EXAMINATION: Primary | ICD-10-CM

## 2020-11-02 DIAGNOSIS — Z23 ENCOUNTER FOR IMMUNIZATION: ICD-10-CM

## 2020-11-02 DIAGNOSIS — I25.10 NON-OCCLUSIVE CORONARY ARTERY DISEASE: ICD-10-CM

## 2020-11-02 PROCEDURE — 90732 PPSV23 VACC 2 YRS+ SUBQ/IM: CPT

## 2020-11-02 PROCEDURE — 99397 PER PM REEVAL EST PAT 65+ YR: CPT | Performed by: INTERNAL MEDICINE

## 2020-11-02 PROCEDURE — 90471 IMMUNIZATION ADMIN: CPT

## 2020-11-02 PROCEDURE — 69209 REMOVE IMPACTED EAR WAX UNI: CPT | Performed by: INTERNAL MEDICINE

## 2020-11-02 NOTE — PROGRESS NOTES
Flores Salguero is a 72 y.o. male who was seen in clinic today (11/2/2020) for a full physical.  He is planning on joining Medicare next year. Still running w/ Al on/off. Assessment & Plan:     Diagnoses and all orders for this visit:    1. Routine physical examination  -     METABOLIC PANEL, COMPREHENSIVE; Future  -     CBC W/O DIFF; Future  -     LIPID PANEL; Future  -     PSA, DIAGNOSTIC (PROSTATE SPECIFIC AG); Future    2. Advanced care planning/counseling discussion    3. Encounter for immunization  -     PNEUMOCOCCAL POLYSACCHARIDE VACCINE, 23-VALENT, ADULT OR IMMUNOSUPPRESSED PT DOSE,  -     ADMIN PNEUMOCOCCAL VACCINE    4. Educated about COVID-19 virus infection    5. Non-occlusive coronary artery disease- asymptomatic. BP is well controlled, lipids control is unclear since starting statin. Continue: current plan pending review of labs. 6. Bilateral impacted cerumen  -     REMOVAL IMPACTED CERUMEN IRRIGATION/LVG UNILAT      Follow-up and Dispositions    · Return in about 1 year (around 11/2/2021) for FULL PHYSICAL - 30 minutes. Subjective:   Piedad William is here today for a full physical.      Routine Physical Exam    Since last visit: had an abnormal cardiac CT scan, started on statin (atorvastatin)      End of Life Planning: This was discussed with him today and he has an advanced directive - a copy HAS NOT been provided. Reviewed DNR/DNI and patient is not interested. Depression Screen:  3 most recent PHQ Screens 11/2/2020   Little interest or pleasure in doing things Not at all   Feeling down, depressed, irritable, or hopeless Not at all   Total Score PHQ 2 0         Fall Risk:   Fall Risk Assessment, last 12 mths 11/2/2020   Able to walk? Yes   Fall in past 12 months? No       Abuse Screen:  Abuse Screening Questionnaire 11/2/2020   Do you ever feel afraid of your partner? N   Are you in a relationship with someone who physically or mentally threatens you?  N   Is it safe for you to go home? Y         Do you average more than 1 drink per night or more than 7 drinks a week: No    In the past three months have you have had more than 4 drinks containing alcohol on one occasion: No    Functional Ability and Level of Safety:   Hearing Screen: Hearing is good. Cognition Screen:  Has your family/caregiver stated any concerns about your memory: no    Ambulation: with no difficulty    Activities of Daily Living:    Exercise: very active  The home contains: no safety equipment. Patient does total self care    Adult Nutrition Screen:  No risk factors noted. Health Maintenance:   Daily Aspirin: no  AAA Screening: n/a: never smoked  Glaucoma Screening: UTD    Immunizations:    Influenza: up to date   Tetanus: up to date   Shingles: up to date   Pneumonia: will do today  Cancer screening:   Prostate: guidelines reviewed, will do today  Colon: guidelines reviewed, up to date      Patient Care Team:  Vicente Whitfield MD as PCP - General (Internal Medicine)  Vicente Whitfield MD as PCP - [facility] Empaneled Provider  Godwin Burton MD (Gastroenterology)  Anabel Mcdaniels MD (Ophthalmology)  Kaycee Street MD (Dermatology)  Debi Cavazos MD (Orthopedic Surgery)       The following sections were reviewed & updated as appropriate: PMH, PSH, FH, and SH. Patient Active Problem List   Diagnosis Code    OA (osteoarthritis) M19.90    Non-occlusive coronary artery disease I25.10          Prior to Admission medications    Medication Sig Start Date End Date Taking? Authorizing Provider   atorvastatin (LIPITOR) 10 mg tablet TAKE 1 TABLET BY MOUTH EVERY DAY 8/30/20  Yes Vicente Whitfield MD   ibuprofen (ADVIL) 200 mg tablet Take  by mouth as needed for Pain. Yes Provider, Historical   varicella-zoster recombinant, PF, (SHINGRIX, PF,) 50 mcg/0.5 mL susr injection 0.5 ml IM once and then repeat in 2-6 months.  10/29/19 11/2/20  Vicente Whitfield MD          No Known Allergies Review of Systems   Constitutional: Negative for chills and fever. Respiratory: Negative for cough and shortness of breath. Cardiovascular: Negative for chest pain and palpitations. Gastrointestinal: Negative for abdominal pain, blood in stool, constipation, diarrhea, heartburn, nausea and vomiting. Genitourinary:        He reports: nocturia x 1. He denies: urinary hesitancy, urinary frequency, weak stream.       Denies trouble getting or maintaining an erection. Denies trouble with AM erections. Musculoskeletal: Positive for joint pain (on/off, worse w/ playing golf). Negative for myalgias. Neurological: Negative for tingling, focal weakness and headaches. Endo/Heme/Allergies: Does not bruise/bleed easily. Psychiatric/Behavioral: Negative for depression. The patient is not nervous/anxious and does not have insomnia. Objective:   Physical Exam  Constitutional:       General: He is not in acute distress. Appearance: Normal appearance. HENT:      Ears:      Comments: Right ear is: 100% occluded with wet cerumen. Left ear is: 100% occluded with wet cerumen. Eyes:      Conjunctiva/sclera: Conjunctivae normal.   Neck:      Thyroid: No thyromegaly. Cardiovascular:      Rate and Rhythm: Regular rhythm. Heart sounds: No murmur. Pulmonary:      Effort: Pulmonary effort is normal.      Breath sounds: Normal breath sounds. No decreased breath sounds or wheezing. Abdominal:      General: Bowel sounds are normal.      Palpations: Abdomen is soft. Tenderness: There is no abdominal tenderness. Musculoskeletal:      Right lower leg: No edema. Left lower leg: No edema.    Psychiatric:         Mood and Affect: Mood and affect normal.            Visit Vitals  /66   Pulse 67   Temp 97.5 °F (36.4 °C) (Temporal)   Resp 14   Ht 6' 1.65\" (1.871 m)   Wt 195 lb 12.8 oz (88.8 kg)   SpO2 96%   BMI 25.38 kg/m²          Advised him to call back or return to office if symptoms worsen/change/persist.  Discussed expected course/resolution/complications of diagnosis in detail with patient. Medication risks/benefits/costs/interactions/alternatives discussed with patient. He was given an after visit summary which includes diagnoses, current medications, & vitals. He expressed understanding with the diagnosis and plan. Aspects of this note may have been generated using voice recognition software. Despite editing, there may be some syntax errors.        Shellie Tubbs MD

## 2020-11-02 NOTE — PROGRESS NOTES
CPE.  Will try to provide copy of AMD.     Quin Howard  is a 72 y.o. @  who present for routine immunizations. Prior to vaccine administration: Consent was obtained. Risks and adverse reactions were discussed. The patient was provided the VIS and they were given an opportunity to ask questions; all questions were addressed. He  denies any symptoms, reactions or allergies that would exclude them from being immunized today. There were no adverse reactions observed post vaccination. Patient was advised to seek medical or call the office with any questions or concerns post vaccination. Patient verbalized understanding.    Marley Odom RN

## 2020-11-02 NOTE — PATIENT INSTRUCTIONS
Well Visit, Over 72: Care Instructions Your Care Instructions Physical exams can help you stay healthy. Your doctor has checked your overall health and may have suggested ways to take good care of yourself. He or she also may have recommended tests. At home, you can help prevent illness with healthy eating, regular exercise, and other steps. Follow-up care is a key part of your treatment and safety. Be sure to make and go to all appointments, and call your doctor if you are having problems. It's also a good idea to know your test results and keep a list of the medicines you take. How can you care for yourself at home? · Reach and stay at a healthy weight. This will lower your risk for many problems, such as obesity, diabetes, heart disease, and high blood pressure. · Get at least 30 minutes of exercise on most days of the week. Walking is a good choice. You also may want to do other activities, such as running, swimming, cycling, or playing tennis or team sports. · Do not smoke. Smoking can make health problems worse. If you need help quitting, talk to your doctor about stop-smoking programs and medicines. These can increase your chances of quitting for good. · Protect your skin from too much sun. When you're outdoors from 10 a.m. to 4 p.m., stay in the shade or cover up with clothing and a hat with a wide brim. Wear sunglasses that block UV rays. Even when it's cloudy, put broad-spectrum sunscreen (SPF 30 or higher) on any exposed skin. · See a dentist one or two times a year for checkups and to have your teeth cleaned. · Wear a seat belt in the car. Follow your doctor's advice about when to have certain tests. These tests can spot problems early. For men and women · Cholesterol. Your doctor will tell you how often to have this done based on your overall health and other things that can increase your risk for heart attack and stroke. · Blood pressure. Have your blood pressure checked during a routine doctor visit. Your doctor will tell you how often to check your blood pressure based on your age, your blood pressure results, and other factors. · Diabetes. Ask your doctor whether you should have tests for diabetes. · Vision. Experts recommend that you have yearly exams for glaucoma and other age-related eye problems. · Hearing. Tell your doctor if you notice any change in your hearing. You can have tests to find out how well you hear. · Colon cancer tests. Keep having colon cancer tests as your doctor recommends. You can have one of several types of tests. · Heart attack and stroke risk. At least every 4 to 6 years, you should have your risk for heart attack and stroke assessed. Your doctor uses factors such as your age, blood pressure, cholesterol, and whether you smoke or have diabetes to show what your risk for a heart attack or stroke is over the next 10 years. · Osteoporosis. Talk to your doctor about whether you should have a bone density test to find out whether you have thinning bones. Ask your doctor if you need to take a calcium plus vitamin D supplement. You may be able to get enough calcium and vitamin D through your diet. For women · Pap test and pelvic exam. You may no longer need a Pap test. Talk with your doctor about whether to stop or continue to have Pap tests. · Breast exam and mammogram. Ask how often you should have a mammogram, which is an X-ray of your breasts. A mammogram can spot breast cancer before it can be felt and when it is easiest to treat. · Thyroid disease. Talk to your doctor about whether to have your thyroid checked as part of a regular physical exam. Women have an increased chance of a thyroid problem. For men · Prostate exam. Talk to your doctor about whether you should have a blood test (called a PSA test) for prostate cancer.  Experts recommend that you discuss the benefits and risks of the test with your doctor before you decide whether to have this test. Some experts say that men ages 79 and older no longer need testing. · Abdominal aortic aneurysm. Ask your doctor whether you should have a test to check for an aneurysm. You may need a test if you ever smoked or if your parent, brother, sister, or child has had an aneurysm. When should you call for help? Watch closely for changes in your health, and be sure to contact your doctor if you have any problems or symptoms that concern you. Where can you learn more? Go to http://www.gray.com/ Enter F513 in the search box to learn more about \"Well Visit, Over 65: Care Instructions. \" Current as of: May 27, 2020               Content Version: 12.6 © 0986-6169 [employer]. Care instructions adapted under license by [employer] (which disclaims liability or warranty for this information). If you have questions about a medical condition or this instruction, always ask your healthcare professional. [place] [address] any warranty or liability for your use of this information. Pneumococcal Polysaccharide Vaccine: What You Need to Know Why get vaccinated? Pneumococcal polysaccharide vaccine (PPSV23) can prevent pneumococcal disease. Pneumococcal disease refers to any illness caused by pneumococcal bacteria. These bacteria can cause many types of illnesses, including pneumonia, which is an infection of the lungs. Pneumococcal bacteria are one of the most common causes of pneumonia. Besides pneumonia, pneumococcal bacteria can also cause: 
· Ear infections, 
· Sinus infections · Meningitis (infection of the tissue covering the brain and spinal cord) · Bacteremia (bloodstream infection) Anyone can get pneumococcal disease, but children under 3years of age, people with certain medical conditions, adults 72 years or older, and cigarette smokers are at the highest risk. Most pneumococcal infections are mild. However, some can result in long-term problems, such as brain damage or hearing loss. Meningitis, bacteremia, and pneumonia caused by pneumococcal disease can be fatal. 
PPSV23 
PPSV23 protects against 23 types of bacteria that cause pneumococcal disease. PPSV23 is recommended for: · All adults 72 years or older, · Anyone 2 years or older with certain medical conditions that can lead to an increased risk for pneumococcal disease. Most people need only one dose of PPSV23. A second dose of PPSV23, and another type of pneumococcal vaccine called PCV13, are recommended for certain high-risk groups. Your health care provider can give you more information. People 65 years or older should get a dose of PPSV23 even if they have already gotten one or more doses of the vaccine before they turned 65. Talk with your health care provider Tell your vaccine provider if the person getting the vaccine: 
· Has had an allergic reaction after a previous dose of PPSV23, or has any severe, life-threatening allergies. In some cases, your health care provider may decide to postpone PPSV23 vaccination to a future visit. People with minor illnesses, such as a cold, may be vaccinated. People who are moderately or severely ill should usually wait until they recover before getting PPSV23. Your health care provider can give you more information. Risks of a vaccine reaction · Redness or pain where the shot is given, feeling tired, fever, or muscle aches can happen after PPSV23. People sometimes faint after medical procedures, including vaccination. Tell your provider if you feel dizzy or have vision changes or ringing in the ears. As with any medicine, there is a very remote chance of a vaccine causing a severe allergic reaction, other serious injury, or death. What if there is a serious problem? An allergic reaction could occur after the vaccinated person leaves the clinic. If you see signs of a severe allergic reaction (hives, swelling of the face and throat, difficulty breathing, a fast heartbeat, dizziness, or weakness), call 9-1-1 and get the person to the nearest hospital. 
For other signs that concern you, call your health care provider. Adverse reactions should be reported to the Vaccine Adverse Event Reporting System (VAERS). Your health care provider will usually file this report, or you can do it yourself. Visit the VAERS website at www.vaers. hhs.gov at www.vaers. hhs.gov or call 9-152.609.5202. VAERS is only for reporting reactions, and VAERS staff do not give medical advice. How can I learn more? · Ask your health care provider. · Call your local or state health department. · Contact the Centers for Disease Control and Prevention (CDC): 
? Call 3-234.540.5105 (1-800-CDC-INFO) or 
? Visit CDC's website at www.cdc.gov/vaccines Vaccine Information Statement PPSV23 Vaccine 10/30/2019 Department of [facility] and [employer] Centers for Disease Control and Prevention Many Vaccine Information Statements are available in [place] and other languages. See www.immunize.org/vis. Hojas de información Sobre Vacunas están disponibles en español y en muchos otros idiomas. Visite Agnes.si. Care instructions adapted under license by [employer] (which disclaims liability or warranty for this information). If you have questions about a medical condition or this instruction, always ask your healthcare professional. [place] [address] any warranty or liability for your use of this information.

## 2020-11-03 LAB
ALBUMIN SERPL-MCNC: 4.7 G/DL (ref 3.8–4.8)
ALBUMIN/GLOB SERPL: 2.1 {RATIO} (ref 1.2–2.2)
ALP SERPL-CCNC: 69 IU/L (ref 39–117)
ALT SERPL-CCNC: 24 IU/L (ref 0–44)
AST SERPL-CCNC: 26 IU/L (ref 0–40)
BILIRUB SERPL-MCNC: 1.4 MG/DL (ref 0–1.2)
BUN SERPL-MCNC: 11 MG/DL (ref 8–27)
BUN/CREAT SERPL: 12 (ref 10–24)
CALCIUM SERPL-MCNC: 9.6 MG/DL (ref 8.6–10.2)
CHLORIDE SERPL-SCNC: 99 MMOL/L (ref 96–106)
CHOLEST SERPL-MCNC: 201 MG/DL (ref 100–199)
CO2 SERPL-SCNC: 24 MMOL/L (ref 20–29)
CREAT SERPL-MCNC: 0.89 MG/DL (ref 0.76–1.27)
ERYTHROCYTE [DISTWIDTH] IN BLOOD BY AUTOMATED COUNT: 12.6 % (ref 11.6–15.4)
GLOBULIN SER CALC-MCNC: 2.2 G/DL (ref 1.5–4.5)
GLUCOSE SERPL-MCNC: 102 MG/DL (ref 65–99)
HCT VFR BLD AUTO: 45.2 % (ref 37.5–51)
HDLC SERPL-MCNC: 89 MG/DL
HGB BLD-MCNC: 14.9 G/DL (ref 13–17.7)
LDLC SERPL CALC-MCNC: 100 MG/DL (ref 0–99)
MCH RBC QN AUTO: 31.1 PG (ref 26.6–33)
MCHC RBC AUTO-ENTMCNC: 33 G/DL (ref 31.5–35.7)
MCV RBC AUTO: 94 FL (ref 79–97)
PLATELET # BLD AUTO: 270 X10E3/UL (ref 150–450)
POTASSIUM SERPL-SCNC: 5 MMOL/L (ref 3.5–5.2)
PROT SERPL-MCNC: 6.9 G/DL (ref 6–8.5)
PSA SERPL-MCNC: 1.2 NG/ML (ref 0–4)
RBC # BLD AUTO: 4.79 X10E6/UL (ref 4.14–5.8)
SODIUM SERPL-SCNC: 138 MMOL/L (ref 134–144)
TRIGL SERPL-MCNC: 67 MG/DL (ref 0–149)
VLDLC SERPL CALC-MCNC: 12 MG/DL (ref 5–40)
WBC # BLD AUTO: 7.6 X10E3/UL (ref 3.4–10.8)

## 2020-11-03 NOTE — PROGRESS NOTES
Results released to patient via [employer]. All labs are stable or at goal for him, except for elevated FBS - first time. Lipids improved. He is on moderate intensity statin, no changes.

## 2020-11-29 RX ORDER — ATORVASTATIN CALCIUM 10 MG/1
TABLET, FILM COATED ORAL
Qty: 90 TAB | Refills: 1 | Status: SHIPPED | OUTPATIENT
Start: 2020-11-29 | End: 2021-04-29

## 2021-04-29 RX ORDER — ATORVASTATIN CALCIUM 10 MG/1
TABLET, FILM COATED ORAL
Qty: 90 TAB | Refills: 1 | Status: SHIPPED | OUTPATIENT
Start: 2021-04-29 | End: 2021-10-25

## 2021-10-25 RX ORDER — ATORVASTATIN CALCIUM 10 MG/1
TABLET, FILM COATED ORAL
Qty: 90 TABLET | Refills: 0 | Status: SHIPPED | OUTPATIENT
Start: 2021-10-25 | End: 2022-01-24

## 2021-10-25 NOTE — TELEPHONE ENCOUNTER
Future Appointments   Date Time Provider Nayeli Bertrand   11/1/2021 10:00 AM Evaristo Moreno MD [facility] MARIXA FREEMAN AMB

## 2021-11-01 ENCOUNTER — OFFICE VISIT (OUTPATIENT)
Dept: INTERNAL MEDICINE CLINIC | Age: 66
End: 2021-11-01
Payer: MEDICARE

## 2021-11-01 VITALS
BODY MASS INDEX: 25.03 KG/M2 | RESPIRATION RATE: 16 BRPM | SYSTOLIC BLOOD PRESSURE: 130 MMHG | DIASTOLIC BLOOD PRESSURE: 72 MMHG | HEART RATE: 66 BPM | WEIGHT: 195 LBS | TEMPERATURE: 98 F | OXYGEN SATURATION: 98 % | HEIGHT: 74 IN

## 2021-11-01 DIAGNOSIS — Z71.89 ADVANCED CARE PLANNING/COUNSELING DISCUSSION: ICD-10-CM

## 2021-11-01 DIAGNOSIS — M25.562 CHRONIC PAIN OF BOTH KNEES: ICD-10-CM

## 2021-11-01 DIAGNOSIS — I25.10 NON-OCCLUSIVE CORONARY ARTERY DISEASE: ICD-10-CM

## 2021-11-01 DIAGNOSIS — G89.29 CHRONIC PAIN OF BOTH KNEES: ICD-10-CM

## 2021-11-01 DIAGNOSIS — M25.561 CHRONIC PAIN OF BOTH KNEES: ICD-10-CM

## 2021-11-01 DIAGNOSIS — Z00.00 WELCOME TO MEDICARE PREVENTIVE VISIT: Primary | ICD-10-CM

## 2021-11-01 PROCEDURE — G0402 INITIAL PREVENTIVE EXAM: HCPCS | Performed by: INTERNAL MEDICINE

## 2021-11-01 PROCEDURE — G8427 DOCREV CUR MEDS BY ELIG CLIN: HCPCS | Performed by: INTERNAL MEDICINE

## 2021-11-01 PROCEDURE — G0403 EKG FOR INITIAL PREVENT EXAM: HCPCS | Performed by: INTERNAL MEDICINE

## 2021-11-01 PROCEDURE — G8510 SCR DEP NEG, NO PLAN REQD: HCPCS | Performed by: INTERNAL MEDICINE

## 2021-11-01 PROCEDURE — G8536 NO DOC ELDER MAL SCRN: HCPCS | Performed by: INTERNAL MEDICINE

## 2021-11-01 PROCEDURE — G8419 CALC BMI OUT NRM PARAM NOF/U: HCPCS | Performed by: INTERNAL MEDICINE

## 2021-11-01 NOTE — PATIENT INSTRUCTIONS

## 2021-11-01 NOTE — PROGRESS NOTES
Camron Anthony is a 77 y.o. male who was seen in clinic today (11/1/2021) for a full physical.          Assessment & Plan:   Below is the assessment and plan developed based on review of pertinent history, physical exam, labs, studies, and medications. 1. Welcome to Medicare preventive visit  -     AMB POC EKG ROUTINE W/ 12 LEADS, SCREEN ()  2. Advanced care planning/counseling discussion  3. Non-occlusive coronary artery disease  Assessment & Plan:  well controlled, asymptomatic. BP is well controlled, lipids are well controlled. Continue: current plan pending review of labs. We did review 10mg vs 20mg atorvastatin. 4. Chronic pain of both knees  Comments:  new dx to me, differential reviewed, sounds like OA, agree w/ limiting activity, agree w/ biking vs running. Advil okay, reviewed expectations & red flags    Follow-up and Dispositions    · Return in about 1 year (around 11/1/2022) for FULL PHYSICAL - 30 minutes. Subjective:   Marta Judge is here today for a full physical.      Annual Wellness Visit- IPPE    Since last visit: he has given up running    The following acute and/or chronic problems were addressed today:    Cardiovascular Review  The patient has non-occlusive coronary artery disease. He reports taking medications as instructed, no medication side effects noted. He generally follows a low fat low cholesterol diet, exercises regularly. End of Life Planning: This was discussed with him today and he has an advanced directive - a copy has been provided. Reviewed DNR/DNI and patient is not interested. Depression Screen:  3 most recent PHQ Screens 11/1/2021   Little interest or pleasure in doing things Not at all   Feeling down, depressed, irritable, or hopeless Not at all   Total Score PHQ 2 0         Fall Risk:   Fall Risk Assessment, last 12 mths 11/1/2021   Able to walk? Yes   Fall in past 12 months? 0   Do you feel unsteady?  0   Are you worried about falling 0       Abuse Screen:  Abuse Screening Questionnaire 11/1/2021   Do you ever feel afraid of your partner? N   Are you in a relationship with someone who physically or mentally threatens you? N   Is it safe for you to go home? Y       Do you average more than 1 drink per night or more than 7 drinks a week: No    In the past three months have you have had more than 4 drinks containing alcohol on one occasion: No    Health Maintenance:   Daily Aspirin: no  AAA Screening: n/a: never smoked    Immunizations:   Covid: up to date   Influenza: up to date   Tetanus: up to date   Shingles: up to date   Pneumonia: up to date  Cancer screening:   Prostate: guidelines reviewed, will do today  Colon: guidelines reviewed, up to date    Functional Ability and Level of Safety:    Hearing: Hearing is good. Cognition Screen:   Has your family/caregiver stated any concerns about your memory: no  Cognitive Screening: Normal - Clock Drawing Test     Ambulation: with no difficulty     Activities of Daily Living: The home contains: no safety equipment. Patient does total self care  Exercise: very active    Adult Nutrition Screen:  No risk factors noted. Patient Care Team:  Karolina Duval MD as PCP - General (Internal Medicine)  Karolina Duval MD as PCP - REHABILITATION [facility] Empaneled Provider  Jeyson Zheng MD (Gastroenterology)  Armando Dowling MD (Ophthalmology)  Cassandra Li MD (Dermatology)  Lobo Greenberg MD (Orthopedic Surgery)       The following sections were reviewed & updated as appropriate: Problem List, Allergies, PMH, PSH, FH, and SH. Prior to Admission medications    Medication Sig Start Date End Date Taking? Authorizing Provider   atorvastatin (LIPITOR) 10 mg tablet TAKE 1 TABLET BY MOUTH EVERY DAY 10/25/21  Yes Karolina Duval MD   ibuprofen (ADVIL) 200 mg tablet Take 400 mg by mouth daily as needed for Pain.    Yes Provider, Historical          Review of Systems   Genitourinary:        He reports: nocturia x 1-2. He denies: urinary hesitancy, urinary frequency, weak stream.   Musculoskeletal: Positive for joint pain (bilateral knee pain, no longer running, riding & playing golf). All other systems reviewed and are negative. Objective:   Physical Exam  Constitutional:       General: He is not in acute distress. Appearance: Normal appearance. Eyes:      Conjunctiva/sclera: Conjunctivae normal.   Cardiovascular:      Rate and Rhythm: Regular rhythm. Heart sounds: No murmur heard. Pulmonary:      Effort: Pulmonary effort is normal.      Breath sounds: Normal breath sounds. No decreased breath sounds or wheezing. Abdominal:      General: Bowel sounds are normal.      Palpations: Abdomen is soft. Tenderness: There is no abdominal tenderness. Musculoskeletal:      Right lower leg: No edema. Left lower leg: No edema.    Psychiatric:         Mood and Affect: Mood and affect normal.          Visit Vitals  /72   Pulse 66   Temp 98 °F (36.7 °C) (Temporal)   Resp 16   Ht 6' 1.5\" (1.867 m)   Wt 195 lb (88.5 kg)   SpO2 98%   BMI 25.38 kg/m²         Lane Contreras MD

## 2021-11-01 NOTE — ASSESSMENT & PLAN NOTE
well controlled, asymptomatic. BP is well controlled, lipids are well controlled. Continue: current plan pending review of labs. We did review 10mg vs 20mg atorvastatin.
0

## 2021-11-01 NOTE — ACP (ADVANCE CARE PLANNING)
Advance Care Planning   Advance Care Planning (ACP) Physician/NP/PA (Provider) Conversation    Date of ACP Conversation: 11/01/21  Persons included in Conversation:  patient  Length of ACP Conversation in minutes: <16 minutes (Non-Billable)    Authorized Decision Maker (if patient is incapable of making informed decisions):   Named in Advance Directive or Healthcare Power of       Primary Decision Maker: [address]  - [phone]    He has an advanced directive - a copy HAS NOT been provided. Reviewed DNR/DNI and patient is not interested- elects Full Code (attempt resuscitation).        Paula Meeks MD

## 2021-11-07 PROBLEM — R73.01 ELEVATED FASTING GLUCOSE: Status: ACTIVE | Noted: 2021-11-07

## 2021-12-23 ENCOUNTER — TELEPHONE (OUTPATIENT)
Dept: INTERNAL MEDICINE CLINIC | Age: 66
End: 2021-12-23

## 2021-12-23 ENCOUNTER — VIRTUAL VISIT (OUTPATIENT)
Dept: INTERNAL MEDICINE CLINIC | Age: 66
End: 2021-12-23
Payer: MEDICARE

## 2021-12-23 DIAGNOSIS — J06.9 VIRAL URI WITH COUGH: Primary | ICD-10-CM

## 2021-12-23 PROCEDURE — 99213 OFFICE O/P EST LOW 20 MIN: CPT | Performed by: NURSE PRACTITIONER

## 2021-12-23 PROCEDURE — G8432 DEP SCR NOT DOC, RNG: HCPCS | Performed by: NURSE PRACTITIONER

## 2021-12-23 PROCEDURE — G0463 HOSPITAL OUTPT CLINIC VISIT: HCPCS | Performed by: NURSE PRACTITIONER

## 2021-12-23 PROCEDURE — 1101F PT FALLS ASSESS-DOCD LE1/YR: CPT | Performed by: NURSE PRACTITIONER

## 2021-12-23 PROCEDURE — 3017F COLORECTAL CA SCREEN DOC REV: CPT | Performed by: NURSE PRACTITIONER

## 2021-12-23 PROCEDURE — G8427 DOCREV CUR MEDS BY ELIG CLIN: HCPCS | Performed by: NURSE PRACTITIONER

## 2021-12-23 NOTE — PATIENT INSTRUCTIONS
Upper Respiratory Infection (Cold): Care Instructions  Your Care Instructions     An upper respiratory infection, or URI, is an infection of the nose, sinuses, or throat. URIs are spread by coughs, sneezes, and direct contact. The common cold is the most frequent kind of URI. The flu and sinus infections are other kinds of URIs. Almost all URIs are caused by viruses. Antibiotics won't cure them. But you can treat most infections with home care. This may include drinking lots of fluids and taking over-the-counter pain medicine. You will probably feel better in 4 to 10 days. The doctor has checked you carefully, but problems can develop later. If you notice any problems or new symptoms, get medical treatment right away. Follow-up care is a key part of your treatment and safety. Be sure to make and go to all appointments, and call your doctor if you are having problems. It's also a good idea to know your test results and keep a list of the medicines you take. How can you care for yourself at home? · To prevent dehydration, drink plenty of fluids. Choose water and other clear liquids until you feel better. If you have kidney, heart, or liver disease and have to limit fluids, talk with your doctor before you increase the amount of fluids you drink. · Take an over-the-counter pain medicine, such as acetaminophen (Tylenol), ibuprofen (Advil, Motrin), or naproxen (Aleve). Read and follow all instructions on the label. · Before you use cough and cold medicines, check the label. These medicines may not be safe for young children or for people with certain health problems. · Be careful when taking over-the-counter cold or flu medicines and Tylenol at the same time. Many of these medicines have acetaminophen, which is Tylenol. Read the labels to make sure that you are not taking more than the recommended dose. Too much acetaminophen (Tylenol) can be harmful.   · Get plenty of rest.  · Do not smoke or allow others to smoke around you. If you need help quitting, talk to your doctor about stop-smoking programs and medicines. These can increase your chances of quitting for good. When should you call for help? Call 911 anytime you think you may need emergency care. For example, call if:    · You have severe trouble breathing. Call your doctor now or seek immediate medical care if:    · You seem to be getting much sicker.     · You have new or worse trouble breathing.     · You have a new or higher fever.     · You have a new rash. Watch closely for changes in your health, and be sure to contact your doctor if:    · You have a new symptom, such as a sore throat, an earache, or sinus pain.     · You cough more deeply or more often, especially if you notice more mucus or a change in the color of your mucus.     · You do not get better as expected. Where can you learn more? Go to http://www.gray.com/  Enter K520 in the search box to learn more about \"Upper Respiratory Infection (Cold): Care Instructions. \"  Current as of: July 6, 2021               Content Version: 13.0  © 2006-2021 Healthwise, Incorporated. Care instructions adapted under license by [employer] (which disclaims liability or warranty for this information). If you have questions about a medical condition or this instruction, always ask your healthcare professional. [address] any warranty or liability for your use of this information.

## 2021-12-23 NOTE — PROGRESS NOTES
Padma Harris is a 77 y.o. male who was seen by synchronous (real-time) audio-video technology on 12/23/2021. Assessment & Plan:   Below is the assessment and plan developed based on review of pertinent history, physical exam (if applicable), labs, studies, and medications. 1. Viral URI with cough  advised covid testing-[facility], [facility]- patient was advised of difficulty obtaining testing  Rest and hydrate  OTC tylenol if needed for fever  mucinex if congestion worsens    I spent at least 23 minutes on this visit with this established patient. ([id number])  Subjective:   Padma Harris was seen for Cold Symptoms      Patient reports cough and congestion starting 2 days ago. No fever, but mild fatigue. He received a call today that his daughter-in-law tested positive. He was exposed to her last weekend. He has taken advil cold and sinus with mild relief. Prior to Admission medications    Medication Sig Start Date End Date Taking? Authorizing Provider   atorvastatin (LIPITOR) 10 mg tablet TAKE 1 TABLET BY MOUTH EVERY DAY 10/25/21   Mendez Arce MD   ibuprofen (ADVIL) 200 mg tablet Take 400 mg by mouth daily as needed for Pain. Provider, Historical       Patient Active Problem List   Diagnosis Code    OA (osteoarthritis) M19.90    Non-occlusive coronary artery disease I25.10    Elevated fasting glucose R73.01     Patient Active Problem List    Diagnosis Date Noted    Elevated fasting glucose 11/07/2021    Non-occlusive coronary artery disease 11/02/2020    OA (osteoarthritis) 03/24/2015     Current Outpatient Medications   Medication Sig Dispense Refill    atorvastatin (LIPITOR) 10 mg tablet TAKE 1 TABLET BY MOUTH EVERY DAY 90 Tablet 0    ibuprofen (ADVIL) 200 mg tablet Take 400 mg by mouth daily as needed for Pain.        No Known Allergies  Past Medical History:   Diagnosis Date    Basal cell carcinoma     Right Thigh    Cancer [facility])      Past Surgical History: Procedure Laterality Date    HX APPENDECTOMY  1965    HX CARPAL TUNNEL RELEASE Left 2016    HX CARPAL TUNNEL RELEASE Right 09/2020    HX COLONOSCOPY  11/25/2015    normal    HX HEENT Left     opthalmoplegia - repairs    HX KNEE ARTHROSCOPY Right 1978    meniscus repair    HX ORTHOPAEDIC  2015    Trigger Finger - Left     HX OTHER SURGICAL      Multiple eye surgery     Family History   Problem Relation Age of Onset    Sleep Apnea Mother     Coronary Art Dis Father 77        CABGx4    Sleep Apnea Brother     Thyroid Disease Sister         ? ?    Sleep Apnea Brother     No Known Problems Daughter     No Known Problems Daughter     No Known Problems Son     No Known Problems Son     Cancer Neg Hx     Diabetes Neg Hx     Hypertension Neg Hx      Social History     Tobacco Use    Smoking status: Never Smoker    Smokeless tobacco: Never Used   Substance Use Topics    Alcohol use: Yes     Alcohol/week: 7.0 standard drinks     Types: 7 Standard drinks or equivalent per week       ROS - per HPI      Objective:   No flowsheet data found. General: alert, cooperative, no distress   Mental  status: normal mood, behavior, speech, dress, motor activity, and thought processes, able to follow commands   Eyes: EOM intact, normal sclera   Mouth: mucous membranes moist   Neck: no visualized mass   Resp: normal effort, no respiratory distress and coughing - dry   Neuro: no gross deficits   Musculoskeletal: normal ROM of neck   Skin: no discoloration or lesions of concern on visible areas   Psychiatric: normal affect, no hallucinations     Jannetta Christyling, was evaluated through a synchronous (real-time) audio-video encounter. The patient (or guardian if applicable) is aware that this is a billable service. Verbal consent to proceed has been obtained within the past 12 months.  The visit was conducted pursuant to the emergency declaration under the [facility], [facility] waiver authority and the [employer] and [employer] General Act. Patient identification was verified, and a caregiver was present when appropriate. The patient was located in a state where the provider was credentialed to provide care.      Manoj Mcgowan NP

## 2021-12-23 NOTE — TELEPHONE ENCOUNTER
Reason for call:   Pt is at [facility] and the line is around the building . Where else could he go?       Is this a new problem: yes     Date of last appointment:  12/23/2021     Can we respond via [employer]: no    Best call back number:[phone]

## 2021-12-24 ENCOUNTER — OFFICE VISIT (OUTPATIENT)
Dept: URGENT CARE | Age: 66
End: 2021-12-24
Payer: MEDICARE

## 2021-12-24 VITALS — HEART RATE: 65 BPM | TEMPERATURE: 98.9 F | RESPIRATION RATE: 18 BRPM | OXYGEN SATURATION: 96 %

## 2021-12-24 DIAGNOSIS — Z20.822 SUSPECTED COVID-19 VIRUS INFECTION: Primary | ICD-10-CM

## 2021-12-24 DIAGNOSIS — Z20.822 EXPOSURE TO COVID-19 VIRUS: ICD-10-CM

## 2021-12-24 LAB — SARS-COV-2 POC: NEGATIVE

## 2021-12-24 PROCEDURE — 99202 OFFICE O/P NEW SF 15 MIN: CPT | Performed by: FAMILY MEDICINE

## 2021-12-24 PROCEDURE — 1101F PT FALLS ASSESS-DOCD LE1/YR: CPT | Performed by: FAMILY MEDICINE

## 2021-12-24 PROCEDURE — G8432 DEP SCR NOT DOC, RNG: HCPCS | Performed by: FAMILY MEDICINE

## 2021-12-24 PROCEDURE — G8536 NO DOC ELDER MAL SCRN: HCPCS | Performed by: FAMILY MEDICINE

## 2021-12-24 PROCEDURE — G8419 CALC BMI OUT NRM PARAM NOF/U: HCPCS | Performed by: FAMILY MEDICINE

## 2021-12-24 PROCEDURE — 87426 SARSCOV CORONAVIRUS AG IA: CPT | Performed by: FAMILY MEDICINE

## 2021-12-24 PROCEDURE — 3017F COLORECTAL CA SCREEN DOC REV: CPT | Performed by: FAMILY MEDICINE

## 2021-12-24 PROCEDURE — G8427 DOCREV CUR MEDS BY ELIG CLIN: HCPCS | Performed by: FAMILY MEDICINE

## 2022-01-14 ENCOUNTER — OFFICE VISIT (OUTPATIENT)
Dept: INTERNAL MEDICINE CLINIC | Age: 67
End: 2022-01-14
Payer: MEDICARE

## 2022-01-14 VITALS
WEIGHT: 201.8 LBS | RESPIRATION RATE: 16 BRPM | OXYGEN SATURATION: 98 % | BODY MASS INDEX: 25.9 KG/M2 | HEART RATE: 68 BPM | DIASTOLIC BLOOD PRESSURE: 81 MMHG | HEIGHT: 74 IN | SYSTOLIC BLOOD PRESSURE: 130 MMHG | TEMPERATURE: 97.3 F

## 2022-01-14 DIAGNOSIS — H93.8X3 EAR FULLNESS, BILATERAL: Primary | ICD-10-CM

## 2022-01-14 PROCEDURE — G8419 CALC BMI OUT NRM PARAM NOF/U: HCPCS | Performed by: NURSE PRACTITIONER

## 2022-01-14 PROCEDURE — G8536 NO DOC ELDER MAL SCRN: HCPCS | Performed by: NURSE PRACTITIONER

## 2022-01-14 PROCEDURE — G8427 DOCREV CUR MEDS BY ELIG CLIN: HCPCS | Performed by: NURSE PRACTITIONER

## 2022-01-14 PROCEDURE — 99213 OFFICE O/P EST LOW 20 MIN: CPT | Performed by: NURSE PRACTITIONER

## 2022-01-14 PROCEDURE — 3017F COLORECTAL CA SCREEN DOC REV: CPT | Performed by: NURSE PRACTITIONER

## 2022-01-14 PROCEDURE — 1101F PT FALLS ASSESS-DOCD LE1/YR: CPT | Performed by: NURSE PRACTITIONER

## 2022-01-14 PROCEDURE — G0463 HOSPITAL OUTPT CLINIC VISIT: HCPCS | Performed by: NURSE PRACTITIONER

## 2022-01-14 PROCEDURE — G8432 DEP SCR NOT DOC, RNG: HCPCS | Performed by: NURSE PRACTITIONER

## 2022-01-14 NOTE — PROGRESS NOTES
HISTORY OF PRESENT ILLNESS  Mariann Pineda is a 77 y.o. male. Patient reports fullness and fluid in left ear x 3-4 days. No congestion or sneezing. He has not taken anything. No dizziness. Hearing muffled at times. He feels some fluid in right ear today. He had recent mild URI symptoms, but was covid negative around Pedro. Visit Vitals  /81   Pulse 68   Temp 97.3 °F (36.3 °C) (Temporal)   Resp 16   Ht 6' 1.5\" (1.867 m)   Wt 201 lb 12.8 oz (91.5 kg)   SpO2 98%   BMI 26.26 kg/m²       HPI    Review of Systems   HENT:        Fluid in left ear       Physical Exam  Constitutional:       Appearance: Normal appearance. HENT:      Head: Normocephalic. Right Ear: A middle ear effusion (cloudy fluid) is present. Tympanic membrane is not erythematous. Left Ear: A middle ear effusion (cloudy fluid, distorted light reflex) is present. Tympanic membrane is not erythematous. Nose:      Right Turbinates: Swollen. Left Turbinates: Swollen. Mouth/Throat:      Lips: Pink. Mouth: Mucous membranes are moist.   Skin:     General: Skin is warm and dry. Neurological:      General: No focal deficit present. Mental Status: He is alert and oriented to person, place, and time. Psychiatric:         Mood and Affect: Mood normal.         Behavior: Behavior normal.         ASSESSMENT and PLAN    ICD-10-CM ICD-9-CM    1. Ear fullness, bilateral  H93.8X3 388.8      No orders of the defined types were placed in this encounter.   zyrtec and flonase OTC  If no improvement over 5-7 days then consider steroid

## 2022-01-24 RX ORDER — ATORVASTATIN CALCIUM 10 MG/1
TABLET, FILM COATED ORAL
Qty: 90 TABLET | Refills: 2 | Status: SHIPPED | OUTPATIENT
Start: 2022-01-24 | End: 2022-10-30

## 2022-03-18 PROBLEM — R73.01 ELEVATED FASTING GLUCOSE: Status: ACTIVE | Noted: 2021-11-07

## 2022-03-19 PROBLEM — I25.10 NON-OCCLUSIVE CORONARY ARTERY DISEASE: Status: ACTIVE | Noted: 2020-11-02

## 2022-10-19 ENCOUNTER — TELEPHONE (OUTPATIENT)
Dept: INTERNAL MEDICINE CLINIC | Age: 67
End: 2022-10-19

## 2022-10-19 DIAGNOSIS — Z12.5 PROSTATE CANCER SCREENING: ICD-10-CM

## 2022-10-19 DIAGNOSIS — I25.10 NON-OCCLUSIVE CORONARY ARTERY DISEASE: ICD-10-CM

## 2022-10-19 DIAGNOSIS — R73.01 ELEVATED FASTING GLUCOSE: Primary | ICD-10-CM

## 2022-10-19 NOTE — TELEPHONE ENCOUNTER
----- Message from Sagrario Kline sent at 10/19/2022  1:01 PM EDT -----  Subject: Message to Provider    QUESTIONS  Information for Provider? Patient has an appointment on 11/07/22 and would   like lab orders put in so that he can get lab work done prior to the   appointment, please contact patient one orders are placed so that he can   schedule lab visit.  ---------------------------------------------------------------------------  --------------  Santiago Umana [facility]  [phone]; OK to leave message on voicemail  ---------------------------------------------------------------------------  --------------  SCRIPT ANSWERS  Relationship to Patient?  Self

## 2022-10-27 LAB
ALBUMIN SERPL-MCNC: 5 G/DL (ref 3.8–4.8)
ALBUMIN/GLOB SERPL: 2.5 {RATIO} (ref 1.2–2.2)
ALP SERPL-CCNC: 70 IU/L (ref 44–121)
ALT SERPL-CCNC: 25 IU/L (ref 0–44)
AST SERPL-CCNC: 29 IU/L (ref 0–40)
BILIRUB SERPL-MCNC: 1.3 MG/DL (ref 0–1.2)
BUN SERPL-MCNC: 16 MG/DL (ref 8–27)
BUN/CREAT SERPL: 19 (ref 10–24)
CALCIUM SERPL-MCNC: 9.5 MG/DL (ref 8.6–10.2)
CHLORIDE SERPL-SCNC: 99 MMOL/L (ref 96–106)
CHOLEST SERPL-MCNC: 180 MG/DL (ref 100–199)
CO2 SERPL-SCNC: 25 MMOL/L (ref 20–29)
CREAT SERPL-MCNC: 0.86 MG/DL (ref 0.76–1.27)
EGFR: 95 ML/MIN/1.73
ERYTHROCYTE [DISTWIDTH] IN BLOOD BY AUTOMATED COUNT: 12.3 % (ref 11.6–15.4)
EST. AVERAGE GLUCOSE BLD GHB EST-MCNC: 114 MG/DL
GLOBULIN SER CALC-MCNC: 2 G/DL (ref 1.5–4.5)
GLUCOSE SERPL-MCNC: 90 MG/DL (ref 70–99)
HBA1C MFR BLD: 5.6 % (ref 4.8–5.6)
HCT VFR BLD AUTO: 44.2 % (ref 37.5–51)
HDLC SERPL-MCNC: 79 MG/DL
HGB BLD-MCNC: 14.7 G/DL (ref 13–17.7)
LDLC SERPL CALC-MCNC: 90 MG/DL (ref 0–99)
MCH RBC QN AUTO: 31.1 PG (ref 26.6–33)
MCHC RBC AUTO-ENTMCNC: 33.3 G/DL (ref 31.5–35.7)
MCV RBC AUTO: 93 FL (ref 79–97)
PLATELET # BLD AUTO: 260 X10E3/UL (ref 150–450)
POTASSIUM SERPL-SCNC: 4.9 MMOL/L (ref 3.5–5.2)
PROT SERPL-MCNC: 7 G/DL (ref 6–8.5)
PSA SERPL-MCNC: 1.2 NG/ML (ref 0–4)
RBC # BLD AUTO: 4.73 X10E6/UL (ref 4.14–5.8)
SODIUM SERPL-SCNC: 137 MMOL/L (ref 134–144)
TRIGL SERPL-MCNC: 56 MG/DL (ref 0–149)
VLDLC SERPL CALC-MCNC: 11 MG/DL (ref 5–40)
WBC # BLD AUTO: 7.2 X10E3/UL (ref 3.4–10.8)

## 2022-10-27 NOTE — PROGRESS NOTES
Results released to patient via [employer]. All labs are stable or at goal for him. He has f/u on 11/7 to review.

## 2022-10-30 RX ORDER — ATORVASTATIN CALCIUM 10 MG/1
TABLET, FILM COATED ORAL
Qty: 90 TABLET | Refills: 0 | Status: SHIPPED | OUTPATIENT
Start: 2022-10-30

## 2022-10-31 NOTE — TELEPHONE ENCOUNTER
Future Appointments   Date Time Provider Nayeli Bertrand   11/7/2022  8:30 AM Dania Thorpe MD [facility] MARIXA FREEMAN AMB

## 2022-11-07 ENCOUNTER — OFFICE VISIT (OUTPATIENT)
Dept: INTERNAL MEDICINE CLINIC | Age: 67
End: 2022-11-07
Payer: MEDICARE

## 2022-11-07 VITALS
BODY MASS INDEX: 25.21 KG/M2 | DIASTOLIC BLOOD PRESSURE: 73 MMHG | OXYGEN SATURATION: 98 % | SYSTOLIC BLOOD PRESSURE: 123 MMHG | TEMPERATURE: 98.2 F | RESPIRATION RATE: 18 BRPM | HEIGHT: 74 IN | WEIGHT: 196.4 LBS | HEART RATE: 58 BPM

## 2022-11-07 DIAGNOSIS — R73.01 ELEVATED FASTING GLUCOSE: ICD-10-CM

## 2022-11-07 DIAGNOSIS — Z71.89 ADVANCED CARE PLANNING/COUNSELING DISCUSSION: ICD-10-CM

## 2022-11-07 DIAGNOSIS — I25.10 NON-OCCLUSIVE CORONARY ARTERY DISEASE: ICD-10-CM

## 2022-11-07 DIAGNOSIS — M15.9 PRIMARY OSTEOARTHRITIS INVOLVING MULTIPLE JOINTS: ICD-10-CM

## 2022-11-07 DIAGNOSIS — Z00.00 MEDICARE ANNUAL WELLNESS VISIT, INITIAL: Primary | ICD-10-CM

## 2022-11-07 PROCEDURE — G8419 CALC BMI OUT NRM PARAM NOF/U: HCPCS | Performed by: INTERNAL MEDICINE

## 2022-11-07 PROCEDURE — G8427 DOCREV CUR MEDS BY ELIG CLIN: HCPCS | Performed by: INTERNAL MEDICINE

## 2022-11-07 PROCEDURE — G0439 PPPS, SUBSEQ VISIT: HCPCS | Performed by: INTERNAL MEDICINE

## 2022-11-07 PROCEDURE — G8536 NO DOC ELDER MAL SCRN: HCPCS | Performed by: INTERNAL MEDICINE

## 2022-11-07 PROCEDURE — 1101F PT FALLS ASSESS-DOCD LE1/YR: CPT | Performed by: INTERNAL MEDICINE

## 2022-11-07 PROCEDURE — 3017F COLORECTAL CA SCREEN DOC REV: CPT | Performed by: INTERNAL MEDICINE

## 2022-11-07 PROCEDURE — G8510 SCR DEP NEG, NO PLAN REQD: HCPCS | Performed by: INTERNAL MEDICINE

## 2022-11-07 NOTE — PROGRESS NOTES
Theresa Leonardo is a 79 y.o. male who was seen in clinic today (11/7/2022) for a full physical.          Assessment & Plan:   Below is the assessment and plan developed based on review of pertinent history, physical exam, labs, studies, and medications. 1. Medicare annual wellness visit, initial  Comments:  recent labs reviewed & discussed with him  2. Advanced care planning/counseling discussion  3. Non-occlusive coronary artery disease  Assessment & Plan:  well controlled, asymptomatic. BP is well controlled, lipids are well controlled. Continue: current plan. 4. Elevated fasting glucose  Comments:  improved, back in the normal range. Previously elevated in '20 and '21. FBS and A1c both normal.  No changes. Assessment & Plan:  Improved, FBS is normal this year, A1c is normal, just continue to work on diet   5. Primary osteoarthritis involving multiple joints  Assessment & Plan:  He is using Ibuprofen 400mg every morning, mostly out of habit. We reviewed pros/cons to medications. Would recommend trying to use prn. Reviewed rational and red flaags     Reviewed when to be concerned about food getting stuck. No red flags. Life style changes. Follow-up and Dispositions    Return in about 1 year (around 11/7/2023), or if symptoms worsen or fail to improve. Subjective:   Debi Parker is here today for a full physical.      Annual Wellness Visit- Initial Visit    Since last visit: no changes    The following acute and/or chronic problems were addressed today:    Cardiovascular Review  The patient has non-occlusive coronary artery disease. He reports taking medications as instructed, no medication side effects noted. He generally follows a low fat low cholesterol diet, exercises regularly. End of Life Planning: This was discussed with him today and he has an advanced directive - a copy has been provided. Reviewed DNR/DNI and patient is not interested.       Depression Screen:  3 most recent PHQ Screens 11/4/2022   Little interest or pleasure in doing things Not at all   Feeling down, depressed, irritable, or hopeless Not at all   Total Score PHQ 2 0         Fall Risk:   Fall Risk Assessment, last 12 mths 11/7/2022   Able to walk? Yes   Fall in past 12 months? 0   Do you feel unsteady? 0   Are you worried about falling 0       Abuse Screen:  Abuse Screening Questionnaire 11/4/2022   Do you ever feel afraid of your partner? N   Are you in a relationship with someone who physically or mentally threatens you? N   Is it safe for you to go home? Y       Do you average more than 1 drink per night or more than 7 drinks a week: No    In the past three months have you have had more than 4 drinks containing alcohol on one occasion: No    Health Maintenance:   Daily Aspirin: no  AAA Screening: n/a: never smoked    Immunizations:   Covid: not up to date - he will look into booster   Influenza: done   Tetanus: up to date   Shingles: up to date   Pneumonia: up to date  Cancer screening:   Prostate: guidelines reviewed, up to date  Colon: guidelines reviewed, up to date    Functional Ability and Level of Safety:   Hearing:  Hearing: (P) Patient reports hearing is good     Cognition Screen:  Has your family/caregiver stated any concerns about your memory?: (P) No  Cognitive Screening: Normal - Clock Drawing Test     Ambulation:  Patient ambulates: (P) with no difficulty    Activities of Daily Living: The home contains: (P) no safety equipment  Functional ADLs: (P) Patient does total self care  Exercise: very active    Adult Nutrition Screen:  No risk factors noted.        Patient Care Team:  Ana Chauhan MD as PCP - General (Internal Medicine Physician)  Ana Chauhan MD as PCP - [facility] Empaneled Provider  Roque Bustillo MD (Gastroenterology)  Mynor Nance MD (Ophthalmology)  Lyudmila Todd MD (Dermatology Physician)  Xena Carreno MD (Orthopedic Surgery)       The following sections were reviewed & updated as appropriate: Problem List, Allergies, PMH, PSH, FH, and SH. Prior to Admission medications    Medication Sig Start Date End Date Taking? Authorizing Provider   atorvastatin (LIPITOR) 10 mg tablet TAKE 1 TABLET BY MOUTH EVERY DAY 10/30/22  Yes Susan Garza MD   ibuprofen (MOTRIN) 200 mg tablet Take 400 mg by mouth daily as needed for Pain. Yes Provider, Historical          Review of Systems   Constitutional:  Negative for chills and fever. Respiratory:  Negative for cough and shortness of breath. Cardiovascular:  Negative for chest pain and palpitations. Gastrointestinal:  Negative for abdominal pain, blood in stool, constipation, diarrhea, heartburn, nausea and vomiting. He reports 2-3 times per year will feel like food gets stuck. Mostly chicken. He thinks it is due to portion size or possible talking while eating. Resolves w/ drinking something fizzy   Genitourinary:         He reports: nocturia x 1. He denies: urinary hesitancy, urinary frequency, weak stream.       Denies trouble getting or maintaining an erection. Denies trouble with AM erections. Musculoskeletal:  Negative for joint pain and myalgias. Neurological:  Negative for tingling, focal weakness and headaches. Endo/Heme/Allergies:  Does not bruise/bleed easily. Psychiatric/Behavioral:  Negative for depression. The patient is not nervous/anxious and does not have insomnia. Objective:   Physical Exam  Constitutional:       General: He is not in acute distress. Eyes:      Conjunctiva/sclera: Conjunctivae normal.   Cardiovascular:      Rate and Rhythm: Regular rhythm. Heart sounds: No murmur heard. Pulmonary:      Effort: Pulmonary effort is normal.      Breath sounds: Normal breath sounds. No decreased breath sounds or wheezing. Abdominal:      General: Bowel sounds are normal.      Palpations: Abdomen is soft. There is no hepatomegaly or splenomegaly. Tenderness:  There is no abdominal tenderness. Musculoskeletal:      Right lower leg: No edema. Left lower leg: No edema.    Psychiatric:         Mood and Affect: Mood and affect normal.         Behavior: Behavior normal.        Visit Vitals  /73   Pulse (!) 58   Temp 98.2 °F (36.8 °C) (Temporal)   Resp 18   Ht 6' 1.5\" (1.867 m)   Wt 196 lb 6.4 oz (89.1 kg)   SpO2 98%   BMI 25.56 kg/m²         Sylvester Atkins MD

## 2022-11-07 NOTE — PATIENT INSTRUCTIONS
Medicare Wellness Visit, Male    The best way to live healthy is to have a lifestyle where you eat a well-balanced diet, exercise regularly, limit alcohol use, and quit all forms of tobacco/nicotine, if applicable. Regular preventive services are another way to keep healthy. Preventive services (vaccines, screening tests, monitoring & exams) can help personalize your care plan, which helps you manage your own care. Screening tests can find health problems at the earliest stages, when they are easiest to treat. [facility] follows the current, evidence-based guidelines published by the [facility] Philipp Moncho ([facility]) when recommending preventive services for our patients. Because we follow these guidelines, sometimes recommendations change over time as research supports it. (For example, a prostate screening blood test is no longer routinely recommended for men with no symptoms). Of course, you and your doctor may decide to screen more often for some diseases, based on your risk and co-morbidities (chronic disease you are already diagnosed with). Preventive services for you include:  - Medicare offers their members a free annual wellness visit, which is time for you and your primary care provider to discuss and plan for your preventive service needs. Take advantage of this benefit every year!  -All adults over age 72 should receive the recommended pneumonia vaccines. Current USPSTF guidelines recommend a series of two vaccines for the best pneumonia protection.   -All adults should have a flu vaccine yearly and tetanus vaccine every 10 years.  -All adults age 48 and older should receive the shingles vaccines (series of two vaccines).        -All adults age 38-68 who are overweight should have a diabetes screening test once every three years.   -Other screening tests & preventive services for persons with diabetes include: an eye exam to screen for diabetic retinopathy, a kidney function test, a foot exam, and stricter control over your cholesterol.   -Cardiovascular screening for adults with routine risk involves an electrocardiogram (ECG) at intervals determined by the provider.   -Colorectal cancer screening should be done for adults age 54-65 with no increased risk factors for colorectal cancer. There are a number of acceptable methods of screening for this type of cancer. Each test has its own benefits and drawbacks. Discuss with your provider what is most appropriate for you during your annual wellness visit. The different tests include: colonoscopy (considered the best screening method), a fecal occult blood test, a fecal DNA test, and sigmoidoscopy.  -All adults born between [facility] should be screened once for Hepatitis C.  -An Abdominal Aortic Aneurysm (AAA) Screening is recommended for men age 73-68 who has ever smoked in their lifetime.      Here is a list of your current Health Maintenance items (your personalized list of preventive services) with a due date:  Health Maintenance Due   Topic Date Due    COVID-19 Vaccine (5 - Booster for Moderna series) 06/26/2022    Yearly Flu Vaccine (1) 08/01/2022

## 2022-11-07 NOTE — ASSESSMENT & PLAN NOTE
He is using Ibuprofen 400mg every morning, mostly out of habit. We reviewed pros/cons to medications. Would recommend trying to use prn.   Reviewed rational and red flaags

## 2023-01-30 NOTE — ACP (ADVANCE CARE PLANNING)
Advance Care Planning     Advance Care Planning (ACP) Physician/NP/PA (Provider) Conversation    Date of ACP Conversation: 11/02/20  Persons included in Conversation:  patient  Length of ACP Conversation in minutes: <16 minutes (Non-Billable)    Authorized Decision Maker (if patient is incapable of making informed decisions):   Named in Advance Directive or Healthcare Power of         He has an advanced directive - a copy HAS NOT been provided. Reviewed DNR/DNI and patient is not interested.          Effie Barnard MD
<<--- Click to launch

## 2023-02-03 RX ORDER — ATORVASTATIN CALCIUM 10 MG/1
TABLET, FILM COATED ORAL
Qty: 90 TABLET | Refills: 3 | Status: SHIPPED | OUTPATIENT
Start: 2023-02-03

## 2023-02-03 NOTE — TELEPHONE ENCOUNTER
Future Appointments   Date Time Provider Nayeli Bertrand   11/10/2023  8:00 AM Luis Zavaleta MD [facility] MARIXA FREEMAN AMB

## 2023-05-21 RX ORDER — ATORVASTATIN CALCIUM 10 MG/1
1 TABLET, FILM COATED ORAL DAILY
COMMUNITY
Start: 2023-02-03

## 2023-05-21 RX ORDER — IBUPROFEN 200 MG
400 TABLET ORAL DAILY PRN
COMMUNITY

## 2023-11-06 ENCOUNTER — TELEPHONE (OUTPATIENT)
Age: 68
End: 2023-11-06

## 2023-11-06 DIAGNOSIS — I25.10 NON-OCCLUSIVE CORONARY ARTERY DISEASE: ICD-10-CM

## 2023-11-06 DIAGNOSIS — R73.01 ELEVATED FASTING GLUCOSE: ICD-10-CM

## 2023-11-06 DIAGNOSIS — R73.01 ELEVATED FASTING GLUCOSE: Primary | ICD-10-CM

## 2023-11-06 NOTE — TELEPHONE ENCOUNTER
Reason for call:  Spoke with pt. Pt has an lizzy next week. Pt will be off tomorrow and would like to get his lab done before lizzy. Pt requested a call back from nurse in regards lab order.     Is this a new problem: Yes    Date of last appointment:  11/7/2022     Can we respond via [employer]: No    Best call back number: Virginia Berg \"Journey\"  [phone]

## 2023-11-06 NOTE — TELEPHONE ENCOUNTER
Labs ready. Needs to be fasting.   Ordered thru [employer].    Future Appointments   Date Time Provider [address]   11/14/2023  7:40 AM Mayito Lim MD [facility] OSIEL THAKKAR AMB

## 2023-11-08 DIAGNOSIS — Z12.5 PROSTATE CANCER SCREENING: Primary | ICD-10-CM

## 2023-11-08 DIAGNOSIS — Z12.5 PROSTATE CANCER SCREENING: ICD-10-CM

## 2023-11-08 LAB
ALBUMIN SERPL-MCNC: 4.7 G/DL (ref 3.9–4.9)
ALBUMIN/GLOB SERPL: 1.9 {RATIO} (ref 1.2–2.2)
ALP SERPL-CCNC: 75 IU/L (ref 44–121)
ALT SERPL-CCNC: 15 IU/L (ref 0–44)
AST SERPL-CCNC: 21 IU/L (ref 0–40)
BILIRUB SERPL-MCNC: 0.9 MG/DL (ref 0–1.2)
BUN SERPL-MCNC: 15 MG/DL (ref 8–27)
BUN/CREAT SERPL: 14 (ref 10–24)
CALCIUM SERPL-MCNC: 10 MG/DL (ref 8.6–10.2)
CHLORIDE SERPL-SCNC: 103 MMOL/L (ref 96–106)
CHOLEST SERPL-MCNC: 185 MG/DL (ref 100–199)
CO2 SERPL-SCNC: 25 MMOL/L (ref 20–29)
CREAT SERPL-MCNC: 1.05 MG/DL (ref 0.76–1.27)
EGFRCR SERPLBLD CKD-EPI 2021: 77 ML/MIN/1.73
ERYTHROCYTE [DISTWIDTH] IN BLOOD BY AUTOMATED COUNT: 12.3 % (ref 11.6–15.4)
GLOBULIN SER CALC-MCNC: 2.5 G/DL (ref 1.5–4.5)
GLUCOSE SERPL-MCNC: 101 MG/DL (ref 70–99)
HBA1C MFR BLD: 5.7 % (ref 4.8–5.6)
HCT VFR BLD AUTO: 41.5 % (ref 37.5–51)
HDLC SERPL-MCNC: 84 MG/DL
HGB BLD-MCNC: 14.1 G/DL (ref 13–17.7)
LDLC SERPL CALC-MCNC: 92 MG/DL (ref 0–99)
MCH RBC QN AUTO: 31.7 PG (ref 26.6–33)
MCHC RBC AUTO-ENTMCNC: 34 G/DL (ref 31.5–35.7)
MCV RBC AUTO: 93 FL (ref 79–97)
PLATELET # BLD AUTO: 285 X10E3/UL (ref 150–450)
POTASSIUM SERPL-SCNC: 4.9 MMOL/L (ref 3.5–5.2)
PROT SERPL-MCNC: 7.2 G/DL (ref 6–8.5)
RBC # BLD AUTO: 4.45 X10E6/UL (ref 4.14–5.8)
SODIUM SERPL-SCNC: 143 MMOL/L (ref 134–144)
TRIGL SERPL-MCNC: 47 MG/DL (ref 0–149)
VLDLC SERPL CALC-MCNC: 9 MG/DL (ref 5–40)
WBC # BLD AUTO: 8.1 X10E3/UL (ref 3.4–10.8)

## 2023-11-08 NOTE — RESULT ENCOUNTER NOTE
Results released to patient via [employer].  All labs are stable or at goal for him.  FBS and A1c up slightly.  He has follow up on 11/14 to review.  Please call [facility] and ask them to add on PSA.  Order placed

## 2023-11-08 NOTE — PROGRESS NOTES
Future Appointments   Date Time Provider [address]   11/14/2023  7:40 AM Jasmin Barraza MD [facility] OSIEL THAKKAR AMB

## 2023-11-09 LAB
PSA SERPL-MCNC: 1.4 NG/ML (ref 0–4)
SPECIMEN STATUS REPORT: NORMAL

## 2023-11-14 ENCOUNTER — OFFICE VISIT (OUTPATIENT)
Age: 68
End: 2023-11-14
Payer: MEDICARE

## 2023-11-14 VITALS
BODY MASS INDEX: 25.41 KG/M2 | SYSTOLIC BLOOD PRESSURE: 158 MMHG | RESPIRATION RATE: 16 BRPM | TEMPERATURE: 98.5 F | WEIGHT: 198 LBS | HEART RATE: 59 BPM | HEIGHT: 74 IN | DIASTOLIC BLOOD PRESSURE: 81 MMHG | OXYGEN SATURATION: 99 %

## 2023-11-14 DIAGNOSIS — Z00.00 MEDICARE ANNUAL WELLNESS VISIT, SUBSEQUENT: Primary | ICD-10-CM

## 2023-11-14 DIAGNOSIS — R03.0 ELEVATED BP WITHOUT DIAGNOSIS OF HYPERTENSION: ICD-10-CM

## 2023-11-14 DIAGNOSIS — R73.01 ELEVATED FASTING GLUCOSE: ICD-10-CM

## 2023-11-14 DIAGNOSIS — R39.9 LOWER URINARY TRACT SYMPTOMS (LUTS): ICD-10-CM

## 2023-11-14 DIAGNOSIS — N52.9 ERECTILE DYSFUNCTION, UNSPECIFIED ERECTILE DYSFUNCTION TYPE: ICD-10-CM

## 2023-11-14 DIAGNOSIS — I25.10 NON-OCCLUSIVE CORONARY ARTERY DISEASE: ICD-10-CM

## 2023-11-14 DIAGNOSIS — Z23 ENCOUNTER FOR IMMUNIZATION: ICD-10-CM

## 2023-11-14 DIAGNOSIS — Z71.89 ADVANCED CARE PLANNING/COUNSELING DISCUSSION: ICD-10-CM

## 2023-11-14 PROCEDURE — 1123F ACP DISCUSS/DSCN MKR DOCD: CPT | Performed by: INTERNAL MEDICINE

## 2023-11-14 PROCEDURE — 3017F COLORECTAL CA SCREEN DOC REV: CPT | Performed by: INTERNAL MEDICINE

## 2023-11-14 PROCEDURE — G0439 PPPS, SUBSEQ VISIT: HCPCS | Performed by: INTERNAL MEDICINE

## 2023-11-14 PROCEDURE — G8484 FLU IMMUNIZE NO ADMIN: HCPCS | Performed by: INTERNAL MEDICINE

## 2023-11-14 SDOH — ECONOMIC STABILITY: FOOD INSECURITY: WITHIN THE PAST 12 MONTHS, YOU WORRIED THAT YOUR FOOD WOULD RUN OUT BEFORE YOU GOT MONEY TO BUY MORE.: NEVER TRUE

## 2023-11-14 SDOH — ECONOMIC STABILITY: INCOME INSECURITY: HOW HARD IS IT FOR YOU TO PAY FOR THE VERY BASICS LIKE FOOD, HOUSING, MEDICAL CARE, AND HEATING?: NOT HARD AT ALL

## 2023-11-14 SDOH — ECONOMIC STABILITY: HOUSING INSECURITY
IN THE LAST 12 MONTHS, WAS THERE A TIME WHEN YOU DID NOT HAVE A STEADY PLACE TO SLEEP OR SLEPT IN A SHELTER (INCLUDING NOW)?: NO

## 2023-11-14 SDOH — ECONOMIC STABILITY: FOOD INSECURITY: WITHIN THE PAST 12 MONTHS, THE FOOD YOU BOUGHT JUST DIDN'T LAST AND YOU DIDN'T HAVE MONEY TO GET MORE.: NEVER TRUE

## 2023-11-14 ASSESSMENT — ENCOUNTER SYMPTOMS
COUGH: 0
SHORTNESS OF BREATH: 0
VOMITING: 0
CONSTIPATION: 0
NAUSEA: 0
DIARRHEA: 0
ABDOMINAL PAIN: 0
BLOOD IN STOOL: 0

## 2023-11-14 ASSESSMENT — PATIENT HEALTH QUESTIONNAIRE - PHQ9
SUM OF ALL RESPONSES TO PHQ QUESTIONS 1-9: 0
SUM OF ALL RESPONSES TO PHQ9 QUESTIONS 1 & 2: 0
SUM OF ALL RESPONSES TO PHQ QUESTIONS 1-9: 0
SUM OF ALL RESPONSES TO PHQ QUESTIONS 1-9: 0
1. LITTLE INTEREST OR PLEASURE IN DOING THINGS: 0
SUM OF ALL RESPONSES TO PHQ QUESTIONS 1-9: 0
2. FEELING DOWN, DEPRESSED OR HOPELESS: 0

## 2023-11-14 ASSESSMENT — LIFESTYLE VARIABLES
HOW MANY STANDARD DRINKS CONTAINING ALCOHOL DO YOU HAVE ON A TYPICAL DAY: 1 OR 2
HOW OFTEN DO YOU HAVE A DRINK CONTAINING ALCOHOL: 2-3 TIMES A WEEK

## 2023-11-14 NOTE — ACP (ADVANCE CARE PLANNING)
Advance Care Planning   Advance Care Planning (ACP) Physician/NP/PA (Provider) Conversation    Date of ACP Conversation: 11/14/23  Persons included in Conversation:  patient  Length of ACP Conversation in minutes: <16 minutes (Non-Billable)    Has ACP document(s) on file - reflects the patient's care preferences. He elects Full Code (Attempt Resuscitation)    The patient has appointed the following active healthcare agents:    Primary Decision MakerFredpurvi [facility] - [phone]    Secondary Decision Maker:  Adeola Lao - Child    Secondary Decision Maker: Teddy Cedeno - Nehemias    Secondary Decision Maker: Ginette Morton - Child    Secondary Decision Maker: Shazia Pun \"Da\" - Child     The Patient has the following current code status:    Code Status: Full Code    Sivakumar Conde MD

## 2023-11-14 NOTE — PROGRESS NOTES
Medicare Annual Wellness Visit    Rosalinda Salcedo is here for Medicare AWV    Assessment & Plan   Medicare annual wellness visit, subsequent  Advanced care planning/counseling discussion  -     FULL CODE  Encounter for immunization  -     pneumococcal 20-valent conjugat (PREVNAR) 0.5 ML KAILA inj; Inject 0.5 mLs into the muscle once for 1 dose, Disp-0.5 mL, R-0Print  Non-occlusive coronary artery disease  Assessment & Plan:  well controlled, asymptomatic. BP is  high today (normally well controlled), lipids are well controlled. Continue: current plan, see discussion about high BP below  Elevated fasting glucose  Assessment & Plan:  Stable, just barely abnormal, reviewed diet changes   Elevated BP without diagnosis of hypertension  Comments:  new dx, normally well controlled, high on my repeat also. Unclear why. Will have him monitor at home & update me in 2-3 wks. Lower urinary tract symptoms (LUTS)  Comments:  chronic issue, slightly worse, differential reviewed, most likely due to increase PO intake. Life style changes and then will revisit meds  Erectile dysfunction, unspecified erectile dysfunction type  Comments:  worsening, minimally symptomatic, not impacting quality of life. We reviewed medications and he will let me know if interested     Recommendations for Preventive Services Due: see orders and patient instructions/AVS.  Recommended screening schedule for the next 5-10 years is provided to the patient in written form: see Patient Instructions/AVS.     Return in 1 month (on 12/14/2023) for Blood Pressure Check. Subjective   Since last visit: weight is stable. See ACP Note for Advanced Care Directive Discussion    Health Maintenance  Immunizations:   COVID: guidelines reviewed - will consider  Influenza: up to date  RSV: guidelines reviewed - will consider   Tetanus: up to date    Shingles: up to date   Pneumonia: script provided    Cancer screening:     Colon: reviewed guidelines, up to date.

## 2023-11-14 NOTE — ASSESSMENT & PLAN NOTE
well controlled, asymptomatic. BP is  high today (normally well controlled), lipids are well controlled.   Continue: current plan, see discussion about high BP below

## 2023-11-29 NOTE — PROGRESS NOTES
Letter sent to patient. All labs are stable or at goal for him. 10 yr CVD risk is 8.1%, will revisit after Cardiac Ca scoring.   PSA stable Patient's anemia is currently uncontrolled. Has not received any PRBCs to date. Etiology likely d/t Iron deficiency and chronic disease due to Chronic Kidney Disease/ESRD  Current CBC reviewed-   Lab Results   Component Value Date    HGB 8.5 (L) 11/29/2023    HCT 28.4 (L) 11/29/2023   Will obtain iron panel and ferritin  Monitor serial CBC and transfuse if patient becomes hemodynamically unstable, symptomatic or H/H drops below 7/21.

## 2023-12-06 ENCOUNTER — TELEPHONE (OUTPATIENT)
Age: 68
End: 2023-12-06

## 2023-12-06 ENCOUNTER — NURSE ONLY (OUTPATIENT)
Age: 68
End: 2023-12-06
Payer: MEDICARE

## 2023-12-06 VITALS — HEART RATE: 72 BPM | DIASTOLIC BLOOD PRESSURE: 77 MMHG | SYSTOLIC BLOOD PRESSURE: 157 MMHG

## 2023-12-06 DIAGNOSIS — R35.1 FREQUENT URINATION AT NIGHT: Primary | ICD-10-CM

## 2023-12-06 DIAGNOSIS — R39.9 LOWER URINARY TRACT SYMPTOMS (LUTS): ICD-10-CM

## 2023-12-06 DIAGNOSIS — I10 HYPERTENSION, ESSENTIAL: ICD-10-CM

## 2023-12-06 LAB
BILIRUBIN, URINE, POC: NEGATIVE
BLOOD URINE, POC: NEGATIVE
GLUCOSE URINE, POC: NEGATIVE
KETONES, URINE, POC: NEGATIVE
LEUKOCYTE ESTERASE, URINE, POC: NEGATIVE
NITRITE, URINE, POC: NEGATIVE
PH, URINE, POC: 6 (ref 4.6–8)
PROTEIN,URINE, POC: NEGATIVE
SPECIFIC GRAVITY, URINE, POC: 1 (ref 1–1.03)
URINALYSIS CLARITY, POC: CLEAR
URINALYSIS COLOR, POC: YELLOW
UROBILINOGEN, POC: NORMAL

## 2023-12-06 PROCEDURE — PBSHW AMB POC URINALYSIS DIP STICK AUTO W/ MICRO: Performed by: INTERNAL MEDICINE

## 2023-12-06 PROCEDURE — 81001 URINALYSIS AUTO W/SCOPE: CPT | Performed by: INTERNAL MEDICINE

## 2023-12-07 ENCOUNTER — TELEPHONE (OUTPATIENT)
Age: 68
End: 2023-12-07

## 2023-12-07 RX ORDER — LOSARTAN POTASSIUM 25 MG/1
25 TABLET ORAL DAILY
Qty: 30 TABLET | Refills: 1 | Status: SHIPPED | OUTPATIENT
Start: 2023-12-07

## 2023-12-07 RX ORDER — TAMSULOSIN HYDROCHLORIDE 0.4 MG/1
0.4 CAPSULE ORAL DAILY
Qty: 30 CAPSULE | Refills: 1 | Status: SHIPPED | OUTPATIENT
Start: 2023-12-07

## 2023-12-07 NOTE — PROGRESS NOTES
Pt requested ' advice as to whether his elevated BP and urinary frequency could be related, as \"all of it started about 6 weeks ago. \" Pt also voiced c/o \"cotton mouth at night\" and wanted to know if that is normal. Pt stated he doesn't want to drink more water at night, d/t urinary frequency. Pt also wants to know how long would he have to be on these medications. Explained to pt  is out if the office today, it may be tomorrow before we get back with him. Pt verbalized understanding.

## 2023-12-07 NOTE — PROGRESS NOTES
Called pt, ID x 2. Advised per ' note regarding recent BP readings  and his med  recommendations. Pt verbalized understanding, stated he will call back with questions, as he was unable to talk at the moment.

## 2023-12-07 NOTE — TELEPHONE ENCOUNTER
Reason for call:  pt returning call to Deb Allison leaving to go out of town tomorrow  Please call today    Is this a new problem: Yes    Date of last appointment:  11/14/2023     Can we respond via [employer]: No    Best call back number:     Harry Cooper \"Journey\" (Self) [phone] (Home)

## 2023-12-07 NOTE — PROGRESS NOTES
He RTC for BP check. Was high at last visit. He was supposed to check amb BP but didn't report any readings. BP is high again, confirming the dx of HTN. No obvious etiology for the increase. I would recommend starting a low BP medication and following up with me in 6 wks for BP check and labs. Due to his complaint of urinary frequency would avoid diuretic and start on losartan 25mg. Comes in dose of 25, 50, and 100mg. Should start to check amb BP and if SBP does not improve to the 130's then can increase to 50mg. No real SE's to monitor for. It does work in his kidneys so will need labs at follow up. Regarding urinary frequency issue. UA was normal.  BPH is a possibility. We can start him on Tamsulosin (Flomax), 1 tab at night, as a trial.  Most common SE can be dizziness, but is generally well tolerated. These were sent to local pharmacy.     BP Readings from Last 5 Encounters:   12/06/23 (!) 157/77   11/14/23 (!) 158/81   11/07/22 123/73   01/14/22 130/81   11/01/21 130/72

## 2023-12-07 NOTE — TELEPHONE ENCOUNTER
Both issues addressed. See Nurse Visit.
Pt stopped by the office today for nurse visit for blood pressure check. Pt is concerned about his blood pressure/PSA and that he is getting up 3-4 with sudden urges to urinate. Pt wants to know if Dr. Violeta Bradley can contact him to discuss the issue before his upcoming time off. Pt can be reached at [phone]. Sabrina Bradley- I did do a urine dip while pt was in for nurse visit today; please see results in nurse visit.      [address]
General

## 2023-12-08 NOTE — PROGRESS NOTES
No I can't think of any reason why the increase in urination and increased BP would be related. Dry mouth might be because he is drinking less due to the urinary frequency. I would recommend both meds. He has f/u with me in January and we can re-evaluate. I'm not sure if these medications are going to be temporary or long term.

## 2023-12-08 NOTE — PROGRESS NOTES
Called pt, ID x 2. Advised per ' note regarding recent med recommendations. Pt verbalized understanding.

## 2023-12-29 ENCOUNTER — TELEPHONE (OUTPATIENT)
Age: 68
End: 2023-12-29

## 2023-12-29 NOTE — TELEPHONE ENCOUNTER
Medication Refill Request    Denice Blackwell is requesting a refill of the following medication(s):   tamsulosin (FLOMAX) 0.4 MG capsule   90 days   Please send refill to:     [id number] [address] [address]  Phone: [phone] Fax: [phone]

## 2024-01-16 ENCOUNTER — TELEPHONE (OUTPATIENT)
Age: 69
End: 2024-01-16

## 2024-01-16 DIAGNOSIS — I10 HYPERTENSION, ESSENTIAL: ICD-10-CM

## 2024-01-16 DIAGNOSIS — I10 HYPERTENSION, ESSENTIAL: Primary | ICD-10-CM

## 2024-01-16 NOTE — TELEPHONE ENCOUNTER
Patient would like to know if Dr. Faye wanted him to have labs done prior to his appointment on 1/19/24.    Victorino Short - [phone]

## 2024-01-19 ENCOUNTER — OFFICE VISIT (OUTPATIENT)
Age: 69
End: 2024-01-19
Payer: MEDICARE

## 2024-01-19 VITALS
HEART RATE: 69 BPM | WEIGHT: 196 LBS | HEIGHT: 74 IN | BODY MASS INDEX: 25.15 KG/M2 | TEMPERATURE: 98.1 F | RESPIRATION RATE: 16 BRPM | OXYGEN SATURATION: 100 % | DIASTOLIC BLOOD PRESSURE: 76 MMHG | SYSTOLIC BLOOD PRESSURE: 140 MMHG

## 2024-01-19 DIAGNOSIS — R39.9 LOWER URINARY TRACT SYMPTOMS (LUTS): ICD-10-CM

## 2024-01-19 DIAGNOSIS — I10 HYPERTENSION, ESSENTIAL: Primary | ICD-10-CM

## 2024-01-19 LAB
BUN SERPL-MCNC: 29 MG/DL (ref 8–27)
BUN/CREAT SERPL: 15 (ref 10–24)
CALCIUM SERPL-MCNC: 9.5 MG/DL (ref 8.6–10.2)
CHLORIDE SERPL-SCNC: 102 MMOL/L (ref 96–106)
CO2 SERPL-SCNC: 23 MMOL/L (ref 20–29)
CREAT SERPL-MCNC: 1.96 MG/DL (ref 0.76–1.27)
EGFRCR SERPLBLD CKD-EPI 2021: 37 ML/MIN/1.73
GLUCOSE SERPL-MCNC: 96 MG/DL (ref 70–99)
POTASSIUM SERPL-SCNC: 4.5 MMOL/L (ref 3.5–5.2)
SODIUM SERPL-SCNC: 142 MMOL/L (ref 134–144)

## 2024-01-19 PROCEDURE — 1123F ACP DISCUSS/DSCN MKR DOCD: CPT | Performed by: INTERNAL MEDICINE

## 2024-01-19 PROCEDURE — G8427 DOCREV CUR MEDS BY ELIG CLIN: HCPCS | Performed by: INTERNAL MEDICINE

## 2024-01-19 PROCEDURE — 99213 OFFICE O/P EST LOW 20 MIN: CPT | Performed by: INTERNAL MEDICINE

## 2024-01-19 PROCEDURE — 3017F COLORECTAL CA SCREEN DOC REV: CPT | Performed by: INTERNAL MEDICINE

## 2024-01-19 PROCEDURE — G8484 FLU IMMUNIZE NO ADMIN: HCPCS | Performed by: INTERNAL MEDICINE

## 2024-01-19 PROCEDURE — 1036F TOBACCO NON-USER: CPT | Performed by: INTERNAL MEDICINE

## 2024-01-19 PROCEDURE — 3077F SYST BP >= 140 MM HG: CPT | Performed by: INTERNAL MEDICINE

## 2024-01-19 PROCEDURE — G8419 CALC BMI OUT NRM PARAM NOF/U: HCPCS | Performed by: INTERNAL MEDICINE

## 2024-01-19 PROCEDURE — 3078F DIAST BP <80 MM HG: CPT | Performed by: INTERNAL MEDICINE

## 2024-01-19 RX ORDER — AMLODIPINE BESYLATE 5 MG/1
5 TABLET ORAL DAILY
Qty: 30 TABLET | Refills: 2 | Status: SHIPPED | OUTPATIENT
Start: 2024-01-19

## 2024-01-19 ASSESSMENT — PATIENT HEALTH QUESTIONNAIRE - PHQ9
SUM OF ALL RESPONSES TO PHQ QUESTIONS 1-9: 0
SUM OF ALL RESPONSES TO PHQ QUESTIONS 1-9: 0
1. LITTLE INTEREST OR PLEASURE IN DOING THINGS: 0
SUM OF ALL RESPONSES TO PHQ QUESTIONS 1-9: 0
SUM OF ALL RESPONSES TO PHQ9 QUESTIONS 1 & 2: 0
SUM OF ALL RESPONSES TO PHQ QUESTIONS 1-9: 0
2. FEELING DOWN, DEPRESSED OR HOPELESS: 0

## 2024-01-19 ASSESSMENT — ENCOUNTER SYMPTOMS
COUGH: 0
VOMITING: 0
NAUSEA: 0
CONSTIPATION: 0
ABDOMINAL PAIN: 0
SHORTNESS OF BREATH: 0
DIARRHEA: 0

## 2024-01-19 NOTE — ASSESSMENT & PLAN NOTE
Improved on medication, but not at goal.  He is still drinking a fair amount in the evening.  We reviewed expectations from medications. He will try to make changed to his PO intake at night and if no changes we reviewed increasing Flomax or seeing urology.

## 2024-01-19 NOTE — ASSESSMENT & PLAN NOTE
Improved and well controlled at home but due to renal insufficiency need to stop losartan. Will start on amlodipine.  He will repeat labs in 2 wks. He will update me in 2 wks with home BP readings

## 2024-01-19 NOTE — PROGRESS NOTES
Victorino Short is a 68 y.o. male who was seen in clinic today (1/19/2024).    Assessment & Plan:   Below is the assessment and plan developed based on review of pertinent history, physical exam, labs, studies, and medications.    1. Hypertension, essential  Assessment & Plan:  Improved and well controlled at home but due to renal insufficiency need to stop losartan. Will start on amlodipine.  He will repeat labs in 2 wks. He will update me in 2 wks with home BP readings   Orders:  -     amLODIPine (NORVASC) 5 MG tablet; Take 1 tablet by mouth daily, Disp-30 tablet, R-2Normal  -     Basic Metabolic Panel; Future  2. Lower urinary tract symptoms (LUTS)  Assessment & Plan:  Improved on medication, but not at goal.  He is still drinking a fair amount in the evening.  We reviewed expectations from medications. He will try to make changed to his PO intake at night and if no changes we reviewed increasing Flomax or seeing urology.        Return in about 6 weeks (around 3/1/2024) for Nurse Visit - BP Check.   Subjective/Objective:   Victorino was seen today for Follow-up     Cardiovascular Review  The patient has hypertension.  This is a diagnosis since his last visit with me (Nov '23).  BP was high at that time and also high on repeat at nurse visit in Dec '23.  He was started on losartan.  He reports taking medications as instructed, medication side effects noted - decreased energy initially and maybe slightly better the longer he has been on it.  Home BP monitoring in range of 110-130's systolic over 70-80's diastolic.  Home diary reviewed.  He generally follows a low sodium diet.         Prior to Admission medications    Medication Sig Start Date End Date Taking? Authorizing Provider   tamsulosin (FLOMAX) 0.4 MG capsule Take 1 capsule by mouth daily 12/7/23  Yes Juliocesar Faye MD   losartan (COZAAR) 25 MG tablet Take 1 tablet by mouth daily 12/7/23  Yes Juliocesar Faye MD   atorvastatin (LIPITOR) 10 MG tablet

## 2024-01-19 NOTE — RESULT ENCOUNTER NOTE
Results released to patient via [employer].  All labs are stable or at goal for him, except for worsening renal fxn.  Significant increase since starting losartan.  He has f/u today to review.

## 2024-01-28 DIAGNOSIS — R39.9 LOWER URINARY TRACT SYMPTOMS (LUTS): ICD-10-CM

## 2024-01-28 RX ORDER — TAMSULOSIN HYDROCHLORIDE 0.4 MG/1
CAPSULE ORAL DAILY
Qty: 90 CAPSULE | Refills: 3 | Status: SHIPPED | OUTPATIENT
Start: 2024-01-28

## 2024-02-01 DIAGNOSIS — N17.9 ACUTE RENAL FAILURE, UNSPECIFIED ACUTE RENAL FAILURE TYPE (HCC): Primary | ICD-10-CM

## 2024-02-02 ENCOUNTER — TELEPHONE (OUTPATIENT)
Age: 69
End: 2024-02-02

## 2024-02-02 ENCOUNTER — PATIENT MESSAGE (OUTPATIENT)
Age: 69
End: 2024-02-02

## 2024-02-02 LAB
BUN SERPL-MCNC: 33 MG/DL (ref 8–27)
BUN/CREAT SERPL: 16 (ref 10–24)
CALCIUM SERPL-MCNC: 9.4 MG/DL (ref 8.6–10.2)
CHLORIDE SERPL-SCNC: 104 MMOL/L (ref 96–106)
CO2 SERPL-SCNC: 25 MMOL/L (ref 20–29)
CREAT SERPL-MCNC: 2.03 MG/DL (ref 0.76–1.27)
EGFRCR SERPLBLD CKD-EPI 2021: 35 ML/MIN/1.73
GLUCOSE SERPL-MCNC: 98 MG/DL (ref 70–99)
POTASSIUM SERPL-SCNC: 5.4 MMOL/L (ref 3.5–5.2)
SODIUM SERPL-SCNC: 143 MMOL/L (ref 134–144)

## 2024-02-02 NOTE — RESULT ENCOUNTER NOTE
Please call patient.  Repeat labs show stable Creatinine.  It is still elevated about baseline. Verify he is no longer taking losartan and only taking amlodipine for his BP.  K is high for the 1st time.  I have ordered renal US and I would like him to follow up with nephrologist, give him information for [facility]. They are all good and who ever can see him the soonest.

## 2024-02-02 NOTE — TELEPHONE ENCOUNTER
Reason for call:  TC from pt. Pt id verified. Pt stated Misty gave him a number and he did not know if that number was to schedule appointment with Nephrologist for consult and /or kidney ultrasound. Pt would like to speak with nurse for clarification. Pt wants to also know which one he needs to schedule first.       Is this a new problem: No    Date of last appointment:  1/19/2024     Can we respond via [employer]: Yes    Best call back number: [phone]

## 2024-02-04 RX ORDER — ATORVASTATIN CALCIUM 10 MG/1
10 TABLET, FILM COATED ORAL DAILY
Qty: 90 TABLET | Refills: 3 | Status: SHIPPED | OUTPATIENT
Start: 2024-02-04

## 2024-02-06 ENCOUNTER — HOSPITAL ENCOUNTER (OUTPATIENT)
Facility: HOSPITAL | Age: 69
Discharge: HOME | End: 2024-02-09
Attending: INTERNAL MEDICINE
Payer: MEDICARE

## 2024-02-06 ENCOUNTER — TELEPHONE (OUTPATIENT)
Age: 69
End: 2024-02-06

## 2024-02-06 DIAGNOSIS — N17.9 ACUTE RENAL FAILURE, UNSPECIFIED ACUTE RENAL FAILURE TYPE (HCC): ICD-10-CM

## 2024-02-06 PROCEDURE — 76770 US EXAM ABDO BACK WALL COMP: CPT

## 2024-02-06 NOTE — TELEPHONE ENCOUNTER
Pt reported he had US done this am, and was told to try emptying his bladder again. Pt voiced concerns of \"having a hard time emptying his bladder lately\"  and would like to what could cause that. Pt would like a call with US results once reviewed. Will forward to MD for review.

## 2024-02-06 NOTE — TELEPHONE ENCOUNTER
Reason for call:  TC from pt. Pt id verified. Pt states he just got back from having his kidney ultrasound done, and has some questions. Pt stated he would like to speak with nurse to discuss if at all possible.     Is this a new problem: No    Date of last appointment:  1/19/2024     Can we respond via [employer]: No - would like phone call     Best call back number: [phone]

## 2024-02-07 DIAGNOSIS — N32.89 BLADDER MASS: Primary | ICD-10-CM

## 2024-02-07 DIAGNOSIS — N28.9 RENAL INSUFFICIENCY: ICD-10-CM

## 2024-02-07 DIAGNOSIS — R33.9 URINARY RETENTION: ICD-10-CM

## 2024-02-07 NOTE — TELEPHONE ENCOUNTER
I just sent him a [email or web address] message with findings and plan.  See result note.  Needs to see urology.  Needs to start Flomax 2 tabs/day.  CT scan ordered.

## 2024-02-07 NOTE — TELEPHONE ENCOUNTER
Cristopher, Processor 2/7/2024 6:08 AM EST    My appointment is Wednesday, March 20th at 2pm with Dr. Virgen Keyes. I called [facility] to see if they could see me sooner but lady I talked with said that is the earliest opening they had.    How well do you know Dr. Shira Keyes?    My test results came in late yesterday afternoon.    Would you have time for a short phone call to share what you believe test results mean?    I really don’t want to wait six weeks to better understand and address what I am dealing with.    Thank you for your guidance.

## 2024-02-07 NOTE — RESULT ENCOUNTER NOTE
Results released to patient via [employer].  See [employer] message.  CT ordered.  Referral placed to [facility].  Please call to get him seen this week (urinary retention, ? Bladder mass).  Flomax 2 tabs/day.

## 2024-02-07 NOTE — TELEPHONE ENCOUNTER
Called pt, ID x 2. Reviewed MCM with pt, regarding US results and PCP's plan moving forward. Pt verbalized understanding, requested to speak with  tomorrow if possible. Pt stated he would rather hold off on starting Flomax at this time as it caused urinary incontinence at night, and that has resolved since stopping it.

## 2024-02-08 ENCOUNTER — TELEPHONE (OUTPATIENT)
Age: 69
End: 2024-02-08

## 2024-02-08 NOTE — TELEPHONE ENCOUNTER
Notified pt I attempted to schedule urology appt for today or Monday 2/12/24, was told a message would be sent out to the nurses, and we would get a call back if there was any openings at any locations for a sooner appt. Pt verbalized understanding.

## 2024-02-09 NOTE — TELEPHONE ENCOUNTER
I called and spoke to patient and his wife.  We reviewed US and labs.  Reviewed differential (obstruction from BPH vs neurogenic bladder).  Reviewed my concerns about damage to the kidneys.  We devised a plan.  He was planning on going out of town on Friday for family issues.    We were able to schedule a follow up with urology for Wed, 2/14.  We were then able to get this moved up to this afternoon.    See [employer] message regarding appointment with urology.    I spent 22 minutes on the phone.

## 2024-04-14 DIAGNOSIS — I10 HYPERTENSION, ESSENTIAL: ICD-10-CM

## 2024-04-14 RX ORDER — AMLODIPINE BESYLATE 5 MG/1
5 TABLET ORAL DAILY
Qty: 90 TABLET | Refills: 0 | Status: SHIPPED | OUTPATIENT
Start: 2024-04-14

## 2024-04-26 DIAGNOSIS — N28.9 RENAL INSUFFICIENCY: Primary | ICD-10-CM

## 2024-04-27 NOTE — PROGRESS NOTES
Future Appointments   Date Time Provider Department Center   5/28/2024  2:40 PM Juliocesar Faye MD [facility]   11/15/2024  7:40 AM Juliocesar Faye MD [facility]

## 2024-05-28 ENCOUNTER — OFFICE VISIT (OUTPATIENT)
Age: 69
End: 2024-05-28
Payer: MEDICARE

## 2024-05-28 VITALS
OXYGEN SATURATION: 98 % | BODY MASS INDEX: 25.05 KG/M2 | TEMPERATURE: 97.3 F | HEART RATE: 67 BPM | DIASTOLIC BLOOD PRESSURE: 67 MMHG | RESPIRATION RATE: 16 BRPM | WEIGHT: 189 LBS | SYSTOLIC BLOOD PRESSURE: 128 MMHG | HEIGHT: 73 IN

## 2024-05-28 DIAGNOSIS — N13.8 BPH WITH URINARY OBSTRUCTION: ICD-10-CM

## 2024-05-28 DIAGNOSIS — I10 HYPERTENSION, ESSENTIAL: Primary | ICD-10-CM

## 2024-05-28 DIAGNOSIS — I25.10 NON-OCCLUSIVE CORONARY ARTERY DISEASE: ICD-10-CM

## 2024-05-28 DIAGNOSIS — N40.1 BPH WITH URINARY OBSTRUCTION: ICD-10-CM

## 2024-05-28 PROCEDURE — 3078F DIAST BP <80 MM HG: CPT | Performed by: INTERNAL MEDICINE

## 2024-05-28 PROCEDURE — 3017F COLORECTAL CA SCREEN DOC REV: CPT | Performed by: INTERNAL MEDICINE

## 2024-05-28 PROCEDURE — 1123F ACP DISCUSS/DSCN MKR DOCD: CPT | Performed by: INTERNAL MEDICINE

## 2024-05-28 PROCEDURE — 99213 OFFICE O/P EST LOW 20 MIN: CPT | Performed by: INTERNAL MEDICINE

## 2024-05-28 PROCEDURE — 1036F TOBACCO NON-USER: CPT | Performed by: INTERNAL MEDICINE

## 2024-05-28 PROCEDURE — G8419 CALC BMI OUT NRM PARAM NOF/U: HCPCS | Performed by: INTERNAL MEDICINE

## 2024-05-28 PROCEDURE — 3074F SYST BP LT 130 MM HG: CPT | Performed by: INTERNAL MEDICINE

## 2024-05-28 PROCEDURE — G8427 DOCREV CUR MEDS BY ELIG CLIN: HCPCS | Performed by: INTERNAL MEDICINE

## 2024-05-28 ASSESSMENT — PATIENT HEALTH QUESTIONNAIRE - PHQ9
SUM OF ALL RESPONSES TO PHQ9 QUESTIONS 1 & 2: 0
SUM OF ALL RESPONSES TO PHQ QUESTIONS 1-9: 0
SUM OF ALL RESPONSES TO PHQ QUESTIONS 1-9: 0
2. FEELING DOWN, DEPRESSED OR HOPELESS: NOT AT ALL
1. LITTLE INTEREST OR PLEASURE IN DOING THINGS: NOT AT ALL
SUM OF ALL RESPONSES TO PHQ QUESTIONS 1-9: 0
SUM OF ALL RESPONSES TO PHQ QUESTIONS 1-9: 0

## 2024-05-28 ASSESSMENT — ENCOUNTER SYMPTOMS
VOMITING: 0
SHORTNESS OF BREATH: 0
NAUSEA: 0
CONSTIPATION: 0
DIARRHEA: 0
ABDOMINAL PAIN: 0
BLOOD IN STOOL: 0
COUGH: 0

## 2024-05-28 NOTE — ASSESSMENT & PLAN NOTE
Well controlled off medications, no reason to restart medication.  Previous BP issues were likely bladder distention related

## 2024-05-28 NOTE — ASSESSMENT & PLAN NOTE
Stable, he is doing self cath TID, labs were done today but not available, will follow up on creatinine. Monitored by specialist- no acute findings meriting change in the plan

## 2024-05-28 NOTE — ASSESSMENT & PLAN NOTE
well controlled, asymptomatic.  BP is at goal, lipids are well controlled.  Continue: current plan.

## 2024-05-28 NOTE — PROGRESS NOTES
Victorino Short is a 68 y.o. male who was seen in clinic today (5/28/2024).    Assessment & Plan:   Below is the assessment and plan developed based on review of pertinent history, physical exam, labs, studies, and medications.    1. Hypertension, essential  Assessment & Plan:  Well controlled off medications, no reason to restart medication.  Previous BP issues were likely bladder distention related   2. Non-occlusive coronary artery disease  Assessment & Plan:  well controlled, asymptomatic.  BP is at goal, lipids are well controlled.  Continue: current plan.  3. BPH with urinary obstruction  Assessment & Plan:  Stable, he is doing self cath TID, labs were done today but not available, will follow up on creatinine. Monitored by specialist- no acute findings meriting change in the plan     Return in about 6 months (around 11/28/2024) for FULL PHYSICAL.   Subjective/Objective:   Victorino was seen today for Follow-up     Since last visit: weight has decreased.  He has f/u with urology next month.  He is doing self cath.  He is sleeping throughout the night now.     Cardiovascular Review  The patient has hypertension and non-occlusive coronary artery disease.  He is not taking medications.  He was on amlodipine w/o any issues.  Home BP monitoring in range of 110-120's systolic over 60-70's diastolic.  He generally follows a low sodium diet, exercises regularly.         Prior to Admission medications    Medication Sig Start Date End Date Taking? Authorizing Provider   atorvastatin (LIPITOR) 10 MG tablet TAKE 1 TABLET BY MOUTH EVERY DAY 2/4/24  Yes Juliocesar Faye MD   tamsulosin (FLOMAX) 0.4 MG capsule TAKE 1 CAPSULE BY MOUTH EVERY DAY 1/28/24  Yes Juliocesar Faye MD   ibuprofen (ADVIL;MOTRIN) 200 MG tablet Take 2 tablets by mouth daily as needed   Yes Automatic Reconciliation, Ar   amLODIPine (NORVASC) 5 MG tablet TAKE 1 TABLET BY MOUTH EVERY DAY  Patient not taking: Reported on 5/28/2024 4/14/24   Yunier

## 2024-05-29 LAB
BUN SERPL-MCNC: 18 MG/DL (ref 8–27)
BUN/CREAT SERPL: 14 (ref 10–24)
CALCIUM SERPL-MCNC: 9.3 MG/DL (ref 8.6–10.2)
CHLORIDE SERPL-SCNC: 99 MMOL/L (ref 96–106)
CO2 SERPL-SCNC: 24 MMOL/L (ref 20–29)
CREAT SERPL-MCNC: 1.31 MG/DL (ref 0.76–1.27)
EGFRCR SERPLBLD CKD-EPI 2021: 59 ML/MIN/1.73
GLUCOSE SERPL-MCNC: 100 MG/DL (ref 70–99)
POTASSIUM SERPL-SCNC: 5 MMOL/L (ref 3.5–5.2)
SODIUM SERPL-SCNC: 137 MMOL/L (ref 134–144)

## 2024-11-08 DIAGNOSIS — Z12.5 PROSTATE CANCER SCREENING: Primary | ICD-10-CM

## 2024-11-08 DIAGNOSIS — R73.01 ELEVATED FASTING GLUCOSE: ICD-10-CM

## 2024-11-08 DIAGNOSIS — I25.10 NON-OCCLUSIVE CORONARY ARTERY DISEASE: ICD-10-CM

## 2024-11-09 NOTE — PROGRESS NOTES
Future Appointments   Date Time Provider Department Center   11/15/2024  7:40 AM Juliocesar Faye MD [facility]

## 2024-11-13 LAB
ALBUMIN SERPL-MCNC: 4.5 G/DL (ref 3.9–4.9)
ALP SERPL-CCNC: 64 IU/L (ref 44–121)
ALT SERPL-CCNC: 17 IU/L (ref 0–44)
AST SERPL-CCNC: 24 IU/L (ref 0–40)
BILIRUB SERPL-MCNC: 1.1 MG/DL (ref 0–1.2)
BUN SERPL-MCNC: 18 MG/DL (ref 8–27)
BUN/CREAT SERPL: 14 (ref 10–24)
CALCIUM SERPL-MCNC: 9.2 MG/DL (ref 8.6–10.2)
CHLORIDE SERPL-SCNC: 104 MMOL/L (ref 96–106)
CHOLEST SERPL-MCNC: 186 MG/DL (ref 100–199)
CO2 SERPL-SCNC: 22 MMOL/L (ref 20–29)
CREAT SERPL-MCNC: 1.31 MG/DL (ref 0.76–1.27)
EGFRCR SERPLBLD CKD-EPI 2021: 59 ML/MIN/1.73
ERYTHROCYTE [DISTWIDTH] IN BLOOD BY AUTOMATED COUNT: 12.7 % (ref 11.6–15.4)
GLOBULIN SER CALC-MCNC: 2.3 G/DL (ref 1.5–4.5)
GLUCOSE SERPL-MCNC: 91 MG/DL (ref 70–99)
HBA1C MFR BLD: 5.8 % (ref 4.8–5.6)
HCT VFR BLD AUTO: 44.2 % (ref 37.5–51)
HDLC SERPL-MCNC: 88 MG/DL
HGB BLD-MCNC: 14.2 G/DL (ref 13–17.7)
LDLC SERPL CALC-MCNC: 89 MG/DL (ref 0–99)
MCH RBC QN AUTO: 30.4 PG (ref 26.6–33)
MCHC RBC AUTO-ENTMCNC: 32.1 G/DL (ref 31.5–35.7)
MCV RBC AUTO: 95 FL (ref 79–97)
PLATELET # BLD AUTO: 245 X10E3/UL (ref 150–450)
POTASSIUM SERPL-SCNC: 5.4 MMOL/L (ref 3.5–5.2)
PROT SERPL-MCNC: 6.8 G/DL (ref 6–8.5)
PSA SERPL-MCNC: 2.1 NG/ML (ref 0–4)
RBC # BLD AUTO: 4.67 X10E6/UL (ref 4.14–5.8)
SODIUM SERPL-SCNC: 142 MMOL/L (ref 134–144)
TRIGL SERPL-MCNC: 46 MG/DL (ref 0–149)
VLDLC SERPL CALC-MCNC: 9 MG/DL (ref 5–40)
WBC # BLD AUTO: 7 X10E3/UL (ref 3.4–10.8)

## 2024-11-15 ENCOUNTER — OFFICE VISIT (OUTPATIENT)
Age: 69
End: 2024-11-15
Payer: MEDICARE

## 2024-11-15 VITALS
DIASTOLIC BLOOD PRESSURE: 74 MMHG | HEART RATE: 62 BPM | BODY MASS INDEX: 25.21 KG/M2 | OXYGEN SATURATION: 99 % | WEIGHT: 190.2 LBS | RESPIRATION RATE: 16 BRPM | TEMPERATURE: 97.9 F | HEIGHT: 73 IN | SYSTOLIC BLOOD PRESSURE: 132 MMHG

## 2024-11-15 DIAGNOSIS — N18.31 STAGE 3A CHRONIC KIDNEY DISEASE (HCC): ICD-10-CM

## 2024-11-15 DIAGNOSIS — G89.29 CHRONIC PAIN OF LEFT KNEE: ICD-10-CM

## 2024-11-15 DIAGNOSIS — M25.511 CHRONIC RIGHT SHOULDER PAIN: ICD-10-CM

## 2024-11-15 DIAGNOSIS — N13.8 BPH WITH URINARY OBSTRUCTION: ICD-10-CM

## 2024-11-15 DIAGNOSIS — R73.01 ELEVATED FASTING GLUCOSE: ICD-10-CM

## 2024-11-15 DIAGNOSIS — M25.562 CHRONIC PAIN OF LEFT KNEE: ICD-10-CM

## 2024-11-15 DIAGNOSIS — Z00.00 ROUTINE PHYSICAL EXAMINATION: Primary | ICD-10-CM

## 2024-11-15 DIAGNOSIS — G89.29 CHRONIC RIGHT SHOULDER PAIN: ICD-10-CM

## 2024-11-15 DIAGNOSIS — I25.10 NON-OCCLUSIVE CORONARY ARTERY DISEASE: ICD-10-CM

## 2024-11-15 DIAGNOSIS — N40.1 BPH WITH URINARY OBSTRUCTION: ICD-10-CM

## 2024-11-15 DIAGNOSIS — Z23 ENCOUNTER FOR IMMUNIZATION: ICD-10-CM

## 2024-11-15 DIAGNOSIS — Z71.89 ADVANCED CARE PLANNING/COUNSELING DISCUSSION: ICD-10-CM

## 2024-11-15 PROCEDURE — 99214 OFFICE O/P EST MOD 30 MIN: CPT | Performed by: INTERNAL MEDICINE

## 2024-11-15 SDOH — ECONOMIC STABILITY: FOOD INSECURITY: WITHIN THE PAST 12 MONTHS, THE FOOD YOU BOUGHT JUST DIDN'T LAST AND YOU DIDN'T HAVE MONEY TO GET MORE.: NEVER TRUE

## 2024-11-15 SDOH — ECONOMIC STABILITY: INCOME INSECURITY: HOW HARD IS IT FOR YOU TO PAY FOR THE VERY BASICS LIKE FOOD, HOUSING, MEDICAL CARE, AND HEATING?: NOT HARD AT ALL

## 2024-11-15 SDOH — ECONOMIC STABILITY: FOOD INSECURITY: WITHIN THE PAST 12 MONTHS, YOU WORRIED THAT YOUR FOOD WOULD RUN OUT BEFORE YOU GOT MONEY TO BUY MORE.: NEVER TRUE

## 2024-11-15 ASSESSMENT — PATIENT HEALTH QUESTIONNAIRE - PHQ9
SUM OF ALL RESPONSES TO PHQ9 QUESTIONS 1 & 2: 0
SUM OF ALL RESPONSES TO PHQ QUESTIONS 1-9: 0
1. LITTLE INTEREST OR PLEASURE IN DOING THINGS: NOT AT ALL
SUM OF ALL RESPONSES TO PHQ QUESTIONS 1-9: 0
2. FEELING DOWN, DEPRESSED OR HOPELESS: NOT AT ALL

## 2024-11-15 ASSESSMENT — ENCOUNTER SYMPTOMS
COUGH: 0
CONSTIPATION: 0
BLOOD IN STOOL: 0
NAUSEA: 0
VOMITING: 0
DIARRHEA: 0
SHORTNESS OF BREATH: 0
ABDOMINAL PAIN: 0

## 2024-11-15 NOTE — PATIENT INSTRUCTIONS
Learning About Getting In and Out of a Bathtub Safely  Introduction  Many falls happen during bathing. All that water makes the bathroom a slippery place.  You may no longer be able to step over the tub wall comfortably and safely.  You might lean on things that aren't meant to support your weight, like a towel bar or the shower curtain.  There are several types of aids that will help keep you safe. You can buy them at [employer] or home improvement stores or online.  What tools can help you in a bathtub?  Tub rail and grab bar    There are many types of bars and rails that can be installed on the walls next to your tub or on the edge of the tub. They can help keep you safe while you're getting in or out of the tub. It's important to have them permanently installed, rather than attached with suction.  Transfer bench    There are several kinds of benches or seats to use in the bathtub. One type sits in the tub and extends out over the side. You sit on the bench. Then you lift one leg at a time into the tub, sliding your body over as you do so. Another common tub bench sits inside the tub. To use this type, you need to be able to safely step into the tub before you sit down.  Nonskid mats    Water makes both the floor of the tub and the bathroom floor slippery. You can buy adhesive strips that stick to the floor of the tub. Or you can use a nonskid tub mat. Outside the tub, make sure you use a rug with a nonskid bottom. Don't use a plain towel.  Hand-held shower head    With a hand-held shower head, you can get clean without having to lower yourself into the tub. If your tub doesn't have a shower curtain, hanging one can keep water from spilling out onto the floor.  Follow-up care is a key part of your treatment and safety. Be sure to make and go to all appointments, and call your doctor if you are having problems. It's also a good idea to know your test results and keep a list of the medicines you take.  Current

## 2024-11-15 NOTE — ASSESSMENT & PLAN NOTE
New dx, last 2 labs show stable creatinine.  It is up from baseline and likely due to retention which is improved.  Will continue to monitor.  We did review the impact of Mobic on renal fxn and encouraged him to use as needed instead of scheduled if he can.  Remain well hydrated.

## 2024-11-15 NOTE — ASSESSMENT & PLAN NOTE
Stable, he is doing self cath TID. Monitored by specialist- no acute findings meriting change in the plan

## 2024-11-15 NOTE — ASSESSMENT & PLAN NOTE
Chronic, at goal (stable), continue current treatment plan.  We reviewed difference between FBS and A1c.

## 2024-11-15 NOTE — PROGRESS NOTES
Medicare Annual Wellness Visit    Victorino Short is here for Medicare AWV    Assessment & Plan   Routine physical examination  Advanced care planning/counseling discussion  Encounter for immunization  Non-occlusive coronary artery disease  Assessment & Plan:  well controlled, asymptomatic.  BP is at goal, lipids are well controlled.  Continue: current plan.  Hypertension, essential  Assessment & Plan:  well controlled, continue current treatment    Elevated fasting glucose  Assessment & Plan:   Chronic, at goal (stable), continue current treatment plan.  We reviewed difference between FBS and A1c.  BPH with urinary obstruction  Assessment & Plan:  Stable, he is doing self cath TID. Monitored by specialist- no acute findings meriting change in the plan  Stage 3a chronic kidney disease (HCC)  Assessment & Plan:  New dx, last 2 labs show stable creatinine.  It is up from baseline and likely due to retention which is improved.  Will continue to monitor.  We did review the impact of Mobic on renal fxn and encouraged him to use as needed instead of scheduled if he can.  Remain well hydrated.  Chronic right shoulder pain  Comments:  new dx to me, mostly asymptomatic, recommended prn use of NSAIDs, continue to monitor activity. No red flags  Chronic pain of left knee  Comments:  new dx to me, symptomatic, recommended going back to see ortho. Reviewed PT vs injection vs NSAID's. Not to the point of surgery.     Recommendations for Preventive Services Due: see orders and patient instructions/AVS.  Recommended screening schedule for the next 5-10 years is provided to the patient in written form: see Patient Instructions/AVS.     Return in 1 year (on 11/15/2025) for Medicare AWV.       Subjective   Since last visit: weight is stable.    See ACP Note for Advanced Care Directive Discussion    Health Maintenance  Immunizations:   COVID: guidelines reviewed - will consider  Influenza: guidelines reviewed - will consider  RSV:

## 2024-11-15 NOTE — ACP (ADVANCE CARE PLANNING)
Advance Care Planning   Advance Care Planning (ACP) Physician/NP/PA (Provider) Conversation    Date of ACP Conversation: 11/15/24  Persons included in Conversation:  patient  Length of ACP Conversation in minutes: <16 minutes (Non-Billable)    We discussed the patient’s choices for care and treatment preferences in case of a health event that adversely affects decision-making abilities or is life-limiting. We discusses the differences between FULL CODE and DNR and when to consider a change in code status.  The limitations with CPR were reviewed.    Has ACP document(s) on file - reflects the patient's care preferences.  He elects Full Code (Attempt Resuscitation)    The patient has appointed the following active healthcare agents:    Primary Decision Maker: Teetee Short - Spouse - [phone]    Secondary Decision Maker: Adeola Lao - Child    Secondary Decision Maker: Teddy Cedeno - Child    Secondary Decision Maker: Ginette Morton - Child    Secondary Decision Maker: Victorino Short \"Da\" - Child     Juliocesar Faye MD

## 2024-11-18 DIAGNOSIS — M25.562 CHRONIC PAIN OF LEFT KNEE: Primary | ICD-10-CM

## 2024-11-18 DIAGNOSIS — G89.29 CHRONIC RIGHT SHOULDER PAIN: ICD-10-CM

## 2024-11-18 DIAGNOSIS — G89.29 CHRONIC PAIN OF LEFT KNEE: Primary | ICD-10-CM

## 2024-11-18 DIAGNOSIS — M25.511 CHRONIC RIGHT SHOULDER PAIN: ICD-10-CM

## 2024-11-19 ENCOUNTER — OFFICE VISIT (OUTPATIENT)
Age: 69
End: 2024-11-19
Payer: MEDICARE

## 2024-11-19 VITALS
WEIGHT: 193 LBS | TEMPERATURE: 97 F | OXYGEN SATURATION: 99 % | HEART RATE: 66 BPM | HEIGHT: 73 IN | RESPIRATION RATE: 16 BRPM | SYSTOLIC BLOOD PRESSURE: 130 MMHG | BODY MASS INDEX: 25.58 KG/M2 | DIASTOLIC BLOOD PRESSURE: 72 MMHG

## 2024-11-19 DIAGNOSIS — R22.1 MASS OF LEFT SIDE OF NECK: Primary | ICD-10-CM

## 2024-11-19 DIAGNOSIS — N18.31 STAGE 3A CHRONIC KIDNEY DISEASE (HCC): ICD-10-CM

## 2024-11-19 PROCEDURE — 3078F DIAST BP <80 MM HG: CPT | Performed by: INTERNAL MEDICINE

## 2024-11-19 PROCEDURE — 3017F COLORECTAL CA SCREEN DOC REV: CPT | Performed by: INTERNAL MEDICINE

## 2024-11-19 PROCEDURE — 1160F RVW MEDS BY RX/DR IN RCRD: CPT | Performed by: INTERNAL MEDICINE

## 2024-11-19 PROCEDURE — 99213 OFFICE O/P EST LOW 20 MIN: CPT | Performed by: INTERNAL MEDICINE

## 2024-11-19 PROCEDURE — 1159F MED LIST DOCD IN RCRD: CPT | Performed by: INTERNAL MEDICINE

## 2024-11-19 PROCEDURE — 3075F SYST BP GE 130 - 139MM HG: CPT | Performed by: INTERNAL MEDICINE

## 2024-11-19 PROCEDURE — 1126F AMNT PAIN NOTED NONE PRSNT: CPT | Performed by: INTERNAL MEDICINE

## 2024-11-19 PROCEDURE — G8427 DOCREV CUR MEDS BY ELIG CLIN: HCPCS | Performed by: INTERNAL MEDICINE

## 2024-11-19 PROCEDURE — G8419 CALC BMI OUT NRM PARAM NOF/U: HCPCS | Performed by: INTERNAL MEDICINE

## 2024-11-19 PROCEDURE — G8484 FLU IMMUNIZE NO ADMIN: HCPCS | Performed by: INTERNAL MEDICINE

## 2024-11-19 PROCEDURE — 1036F TOBACCO NON-USER: CPT | Performed by: INTERNAL MEDICINE

## 2024-11-19 PROCEDURE — 1123F ACP DISCUSS/DSCN MKR DOCD: CPT | Performed by: INTERNAL MEDICINE

## 2024-11-19 ASSESSMENT — PATIENT HEALTH QUESTIONNAIRE - PHQ9
SUM OF ALL RESPONSES TO PHQ QUESTIONS 1-9: 0
1. LITTLE INTEREST OR PLEASURE IN DOING THINGS: NOT AT ALL
SUM OF ALL RESPONSES TO PHQ9 QUESTIONS 1 & 2: 0
2. FEELING DOWN, DEPRESSED OR HOPELESS: NOT AT ALL
SUM OF ALL RESPONSES TO PHQ QUESTIONS 1-9: 0

## 2024-11-19 NOTE — ASSESSMENT & PLAN NOTE
Asymptomatic, reviewed risk of Tylenol vs NSAID's for his arthritis on his kidney function.  Will recommend Tylenol for OA pain and only use minimal NSAID's for severe pain. He will update me if this doesn't work.

## 2024-11-19 NOTE — PROGRESS NOTES
Victorino Short is a 69 y.o. male who was seen in clinic today (11/19/2024) for an acute visit.      Assessment & Plan:   Below is the assessment and plan developed based on review of pertinent history, physical exam, labs, studies, and medications.    1. Mass of left side of neck  Comments:  new dx, differential reviewed, favor cyst but could be a lymph node. Will get US. Reviewed next step will depend on results. For now will monitor.  Orders:  -     US HEAD NECK SOFT TISSUE THYROID; Future  2. Stage 3a chronic kidney disease (HCC)  Assessment & Plan:  Asymptomatic, reviewed risk of Tylenol vs NSAID's for his arthritis on his kidney function.  Will recommend Tylenol for OA pain and only use minimal NSAID's for severe pain. He will update me if this doesn't work.      Return if symptoms worsen or fail to improve.   Subjective/Objective:   Victorino was seen today for Mass    He was last seen on 11/15/24 but forgot to ask about an enlarged lymph node in his neck he noticed since the end of October.      Prior to Admission medications    Medication Sig Start Date End Date Taking? Authorizing Provider   MELOXICAM PO Take by mouth daily   Yes Provider, MD Ludy   atorvastatin (LIPITOR) 10 MG tablet TAKE 1 TABLET BY MOUTH EVERY DAY 2/4/24  Yes Juliocesar Faye MD        Review of Systems- per HPI     Physical Exam  Neck:      Thyroid: No thyroid mass or thyromegaly.      Comments: Left side of the neck there is a 3cm, soft, slightly mobile, non-tender subcutaneous mass  Lymphadenopathy:      Cervical: No cervical adenopathy.       Vitals:    11/19/24 0823 11/19/24 0828   BP: (!) 152/81 130/72   Pulse: 63 66   Resp: 16    Temp: 97 °F (36.1 °C)    TempSrc: Temporal    SpO2: 99%    Weight: 87.5 kg (193 lb)    Height: 1.865 m (6' 1.43\")         Advised him to call back or return to office if symptoms worsen/change/persist.  Discussed expected course/resolution/complications of diagnosis in detail with

## 2024-11-20 DIAGNOSIS — M25.562 CHRONIC PAIN OF LEFT KNEE: Primary | ICD-10-CM

## 2024-11-20 DIAGNOSIS — G89.29 CHRONIC PAIN OF LEFT KNEE: Primary | ICD-10-CM

## 2024-11-20 DIAGNOSIS — M25.511 CHRONIC RIGHT SHOULDER PAIN: ICD-10-CM

## 2024-11-20 DIAGNOSIS — G89.29 CHRONIC RIGHT SHOULDER PAIN: ICD-10-CM

## 2024-11-20 RX ORDER — MELOXICAM 7.5 MG/1
7.5 TABLET ORAL DAILY PRN
Qty: 30 TABLET | Refills: 1 | Status: SHIPPED | OUTPATIENT
Start: 2024-11-20

## 2024-11-27 ENCOUNTER — HOSPITAL ENCOUNTER (OUTPATIENT)
Facility: HOSPITAL | Age: 69
Discharge: HOME | End: 2024-11-30
Attending: INTERNAL MEDICINE
Payer: MEDICARE

## 2024-11-27 ENCOUNTER — TELEPHONE (OUTPATIENT)
Age: 69
End: 2024-11-27

## 2024-11-27 DIAGNOSIS — R22.1 MASS OF LEFT SIDE OF NECK: ICD-10-CM

## 2024-11-27 PROCEDURE — 76536 US EXAM OF HEAD AND NECK: CPT

## 2024-11-27 NOTE — TELEPHONE ENCOUNTER
On-call covering for Dr. Faye.  Notify pt I reviewed his ultrasound which shows 3.5 cm left neck mass felt likely to be a lymph node and Dr. Faye will discuss recommendations for evaluation or monitoring with him  next week when he returns to the office.

## 2024-11-27 NOTE — TELEPHONE ENCOUNTER
US tech Nona from [facility] called to report pt had US done this am with abnormal findings, and wanted to alert PCP. Reading has been resulted for MD review. Will forward to on call provider covering for  today to review.

## 2024-12-30 ENCOUNTER — TELEPHONE (OUTPATIENT)
Age: 69
End: 2024-12-30

## 2024-12-30 DIAGNOSIS — R22.1 MASS OF LEFT SIDE OF NECK: Primary | ICD-10-CM

## 2024-12-30 NOTE — TELEPHONE ENCOUNTER
Reason for call:   has requested that [facility] call him personally, He is aware of the  msg    Is this a new problem: Yes    Date of last appointment:  11/19/2024     Can we respond via [employer]: No    Best call back number: cal souza [phone]

## 2024-12-30 NOTE — TELEPHONE ENCOUNTER
I had multiple openings for tomorrow this morning, but they have all been filled. Remind him he can schedule appointments via [facility].  I will review this message after clinic,  but if he is not feeling good then should be evaluated, either in our office or .  I will respond to [facility] message later today.

## 2024-12-30 NOTE — TELEPHONE ENCOUNTER
Patient called to make sure Dr. Faye had seen the [facility] he sent him last night.  Was hoping to be able to see him sooner than next week since he feels so bad.  Patient states he would like to talk to Dr. Faye for just a few minutes if he has time.

## 2024-12-31 ENCOUNTER — PATIENT MESSAGE (OUTPATIENT)
Age: 69
End: 2024-12-31

## 2024-12-31 ENCOUNTER — OFFICE VISIT (OUTPATIENT)
Age: 69
End: 2024-12-31
Payer: MEDICARE

## 2024-12-31 VITALS
HEIGHT: 73 IN | TEMPERATURE: 98.3 F | WEIGHT: 193.6 LBS | HEART RATE: 72 BPM | OXYGEN SATURATION: 98 % | DIASTOLIC BLOOD PRESSURE: 67 MMHG | SYSTOLIC BLOOD PRESSURE: 133 MMHG | RESPIRATION RATE: 16 BRPM | BODY MASS INDEX: 25.66 KG/M2

## 2024-12-31 DIAGNOSIS — R50.9 FEVER, UNSPECIFIED FEVER CAUSE: Primary | ICD-10-CM

## 2024-12-31 PROCEDURE — 99213 OFFICE O/P EST LOW 20 MIN: CPT | Performed by: INTERNAL MEDICINE

## 2024-12-31 PROCEDURE — 3017F COLORECTAL CA SCREEN DOC REV: CPT | Performed by: INTERNAL MEDICINE

## 2024-12-31 PROCEDURE — G8482 FLU IMMUNIZE ORDER/ADMIN: HCPCS | Performed by: INTERNAL MEDICINE

## 2024-12-31 PROCEDURE — 1036F TOBACCO NON-USER: CPT | Performed by: INTERNAL MEDICINE

## 2024-12-31 PROCEDURE — G8427 DOCREV CUR MEDS BY ELIG CLIN: HCPCS | Performed by: INTERNAL MEDICINE

## 2024-12-31 PROCEDURE — G8419 CALC BMI OUT NRM PARAM NOF/U: HCPCS | Performed by: INTERNAL MEDICINE

## 2024-12-31 PROCEDURE — 3075F SYST BP GE 130 - 139MM HG: CPT | Performed by: INTERNAL MEDICINE

## 2024-12-31 PROCEDURE — 1126F AMNT PAIN NOTED NONE PRSNT: CPT | Performed by: INTERNAL MEDICINE

## 2024-12-31 PROCEDURE — 3078F DIAST BP <80 MM HG: CPT | Performed by: INTERNAL MEDICINE

## 2024-12-31 PROCEDURE — 1123F ACP DISCUSS/DSCN MKR DOCD: CPT | Performed by: INTERNAL MEDICINE

## 2024-12-31 PROCEDURE — 1159F MED LIST DOCD IN RCRD: CPT | Performed by: INTERNAL MEDICINE

## 2024-12-31 ASSESSMENT — PATIENT HEALTH QUESTIONNAIRE - PHQ9
2. FEELING DOWN, DEPRESSED OR HOPELESS: NOT AT ALL
SUM OF ALL RESPONSES TO PHQ QUESTIONS 1-9: 0
SUM OF ALL RESPONSES TO PHQ9 QUESTIONS 1 & 2: 0
SUM OF ALL RESPONSES TO PHQ QUESTIONS 1-9: 0
1. LITTLE INTEREST OR PLEASURE IN DOING THINGS: NOT AT ALL
SUM OF ALL RESPONSES TO PHQ QUESTIONS 1-9: 0
SUM OF ALL RESPONSES TO PHQ QUESTIONS 1-9: 0

## 2024-12-31 NOTE — TELEPHONE ENCOUNTER
Reviewed Telephone call and MyChart messages from today. Returned his call.  He reports LN is unchanged.  Biopsy ordered.    Having symptoms indicative of infection. He has appointment with Dr Anaya tomorrow.  Reviewed needs to be evaluated to determine etiology.

## 2024-12-31 NOTE — PROGRESS NOTES
Victorino Short is a 69 y.o. male Established patient who presents today for evaluation of the following -           Assessment & Plan  1. Fever, chills, and shaking  - Symptoms suggest possible bacterial etiology  - Intermittent fevers, chills, and shaking since Friday  - Negative for COVID-19 and influenza  - Blood cultures and urine sample to be obtained  - Continue Tylenol and Advil for symptom management    2. Bladder dysfunction  - Caths 3-4 times daily  - Concern about potential kidney infection, but no back pain or other symptoms  - Urine sample to rule out UTI             Orders Placed This Encounter    Culture, Urine     Order Specific Question:   Specify (ex-cath, midstream, cysto, etc)?     Answer:   cath    Culture, Blood 1    CBC with Auto Differential    Comprehensive Metabolic Panel    Urinalysis with Microscopic     Order Specific Question:   SPECIFY(EX-CATH,MIDSTREAM,CYSTO,ETC)?     Answer:   midstream       Follow-up Disposition:     No follow-ups on file.                   History of Present Illness  The patient presents with fever, chills, and shaking since Friday. Symptoms include intermittent chills, shaking, and tingling in fingers, severe enough to impair driving. Episodes last a few minutes. He reports intermittent fevers managed with Advil every 4 hours, which increases body temperature and causes sweating. A similar episode occurred last night, managed with 2 Advil tablets and increased water intake. COVID-19 test on Sunday night was negative. No sore throat, cough, shortness of breath, wheezing, runny nose, or back pain. He feels lethargic and had profuse sweating yesterday. This morning, he had one episode of diarrhea, with no other bowel changes.    He has a non-functioning bladder, requiring catheterization 3-4 times daily. Concern about potential kidney issue, but no associated symptoms.      -denies cough, congestion, chest pain, shortness of breath, or abdominal pain.

## 2024-12-31 NOTE — PATIENT INSTRUCTIONS
Nearest [facility]:    [facility]  [address]  [address]  [place] [address]  Phone: [phone]    [facility]  [address]  [address]  [place] [address]  Phone: [phone]

## 2025-01-02 RX ORDER — SULFAMETHOXAZOLE AND TRIMETHOPRIM 800; 160 MG/1; MG/1
1 TABLET ORAL 2 TIMES DAILY
Qty: 14 TABLET | Refills: 0 | Status: SHIPPED | OUTPATIENT
Start: 2025-01-02 | End: 2025-01-09

## 2025-01-06 DIAGNOSIS — R74.8 ELEVATED LIVER ENZYMES: Primary | ICD-10-CM

## 2025-01-06 LAB
ALBUMIN SERPL-MCNC: 4.3 G/DL (ref 3.9–4.9)
ALP SERPL-CCNC: 154 IU/L (ref 44–121)
ALT SERPL-CCNC: 89 IU/L (ref 0–44)
APPEARANCE UR: ABNORMAL
AST SERPL-CCNC: 81 IU/L (ref 0–40)
BACTERIA #/AREA URNS HPF: ABNORMAL /[HPF]
BACTERIA BLD CULT: NORMAL
BACTERIA UR CULT: ABNORMAL
BASOPHILS # BLD AUTO: 0.1 X10E3/UL (ref 0–0.2)
BASOPHILS NFR BLD AUTO: 1 %
BILIRUB SERPL-MCNC: 0.8 MG/DL (ref 0–1.2)
BILIRUB UR QL STRIP: NEGATIVE
BUN SERPL-MCNC: 23 MG/DL (ref 8–27)
BUN/CREAT SERPL: 18 (ref 10–24)
CALCIUM SERPL-MCNC: 9.2 MG/DL (ref 8.6–10.2)
CASTS URNS QL MICRO: ABNORMAL /LPF
CHLORIDE SERPL-SCNC: 100 MMOL/L (ref 96–106)
CO2 SERPL-SCNC: 22 MMOL/L (ref 20–29)
COLOR UR: YELLOW
CREAT SERPL-MCNC: 1.28 MG/DL (ref 0.76–1.27)
EGFRCR SERPLBLD CKD-EPI 2021: 61 ML/MIN/1.73
EOSINOPHIL # BLD AUTO: 0.1 X10E3/UL (ref 0–0.4)
EOSINOPHIL NFR BLD AUTO: 1 %
EPI CELLS #/AREA URNS HPF: ABNORMAL /HPF (ref 0–10)
ERYTHROCYTE [DISTWIDTH] IN BLOOD BY AUTOMATED COUNT: 12.4 % (ref 11.6–15.4)
GLOBULIN SER CALC-MCNC: 2.6 G/DL (ref 1.5–4.5)
GLUCOSE SERPL-MCNC: 81 MG/DL (ref 70–99)
GLUCOSE UR QL STRIP: NEGATIVE
HCT VFR BLD AUTO: 39 % (ref 37.5–51)
HGB BLD-MCNC: 13.1 G/DL (ref 13–17.7)
HGB UR QL STRIP: ABNORMAL
IMM GRANULOCYTES # BLD AUTO: 0 X10E3/UL (ref 0–0.1)
IMM GRANULOCYTES NFR BLD AUTO: 0 %
KETONES UR QL STRIP: NEGATIVE
LEUKOCYTE ESTERASE UR QL STRIP: ABNORMAL
LYMPHOCYTES # BLD AUTO: 1.8 X10E3/UL (ref 0.7–3.1)
LYMPHOCYTES NFR BLD AUTO: 17 %
MCH RBC QN AUTO: 30.8 PG (ref 26.6–33)
MCHC RBC AUTO-ENTMCNC: 33.6 G/DL (ref 31.5–35.7)
MCV RBC AUTO: 92 FL (ref 79–97)
MICRO URNS: ABNORMAL
MONOCYTES # BLD AUTO: 1.3 X10E3/UL (ref 0.1–0.9)
MONOCYTES NFR BLD AUTO: 12 %
NEUTROPHILS # BLD AUTO: 7.4 X10E3/UL (ref 1.4–7)
NEUTROPHILS NFR BLD AUTO: 69 %
NITRITE UR QL STRIP: NEGATIVE
PH UR STRIP: 5.5 [PH] (ref 5–7.5)
PLATELET # BLD AUTO: 282 X10E3/UL (ref 150–450)
POTASSIUM SERPL-SCNC: 4.1 MMOL/L (ref 3.5–5.2)
PROT SERPL-MCNC: 6.9 G/DL (ref 6–8.5)
PROT UR QL STRIP: ABNORMAL
RBC # BLD AUTO: 4.26 X10E6/UL (ref 4.14–5.8)
RBC #/AREA URNS HPF: ABNORMAL /HPF (ref 0–2)
SODIUM SERPL-SCNC: 138 MMOL/L (ref 134–144)
SP GR UR STRIP: 1.02 (ref 1–1.03)
UROBILINOGEN UR STRIP-MCNC: 0.2 MG/DL (ref 0.2–1)
WBC # BLD AUTO: 10.6 X10E3/UL (ref 3.4–10.8)
WBC #/AREA URNS HPF: >30 /HPF (ref 0–5)

## 2025-01-15 DIAGNOSIS — R74.8 ACID PHOSPHATASE ELEVATED: Primary | ICD-10-CM

## 2025-01-15 DIAGNOSIS — R74.8 ACID PHOSPHATASE ELEVATED: ICD-10-CM

## 2025-01-16 LAB
ALBUMIN SERPL-MCNC: 4.5 G/DL (ref 3.9–4.9)
ALP SERPL-CCNC: 122 IU/L (ref 44–121)
ALT SERPL-CCNC: 41 IU/L (ref 0–44)
AST SERPL-CCNC: 29 IU/L (ref 0–40)
BILIRUB SERPL-MCNC: 0.8 MG/DL (ref 0–1.2)
BUN SERPL-MCNC: 20 MG/DL (ref 8–27)
BUN/CREAT SERPL: 15 (ref 10–24)
CALCIUM SERPL-MCNC: 9.5 MG/DL (ref 8.6–10.2)
CHLORIDE SERPL-SCNC: 101 MMOL/L (ref 96–106)
CO2 SERPL-SCNC: 27 MMOL/L (ref 20–29)
CREAT SERPL-MCNC: 1.3 MG/DL (ref 0.76–1.27)
EGFRCR SERPLBLD CKD-EPI 2021: 59 ML/MIN/1.73
GLOBULIN SER CALC-MCNC: 2.8 G/DL (ref 1.5–4.5)
GLUCOSE SERPL-MCNC: 87 MG/DL (ref 70–99)
POTASSIUM SERPL-SCNC: 4.9 MMOL/L (ref 3.5–5.2)
PROT SERPL-MCNC: 7.3 G/DL (ref 6–8.5)
SODIUM SERPL-SCNC: 144 MMOL/L (ref 134–144)

## 2025-01-19 RX ORDER — ATORVASTATIN CALCIUM 10 MG/1
10 TABLET, FILM COATED ORAL DAILY
Qty: 90 TABLET | Refills: 3 | Status: SHIPPED | OUTPATIENT
Start: 2025-01-19

## 2025-01-20 ENCOUNTER — HOSPITAL ENCOUNTER (OUTPATIENT)
Facility: HOSPITAL | Age: 70
Discharge: HOME | End: 2025-01-23
Attending: INTERNAL MEDICINE
Payer: MEDICARE

## 2025-01-20 DIAGNOSIS — R22.1 MASS OF LEFT SIDE OF NECK: ICD-10-CM

## 2025-01-20 PROCEDURE — 6360000002 HC RX W HCPCS: Performed by: PHYSICIAN ASSISTANT

## 2025-01-20 PROCEDURE — 76942 ECHO GUIDE FOR BIOPSY: CPT

## 2025-01-20 RX ORDER — LIDOCAINE HYDROCHLORIDE 10 MG/ML
10 INJECTION, SOLUTION EPIDURAL; INFILTRATION; INTRACAUDAL; PERINEURAL ONCE
Status: COMPLETED | OUTPATIENT
Start: 2025-01-20 | End: 2025-01-20

## 2025-01-20 RX ADMIN — LIDOCAINE HYDROCHLORIDE 10 ML: 10 INJECTION, SOLUTION EPIDURAL; INFILTRATION; INTRACAUDAL; PERINEURAL at 08:44

## 2025-01-23 ENCOUNTER — PATIENT MESSAGE (OUTPATIENT)
Age: 70
End: 2025-01-23

## 2025-01-30 ENCOUNTER — TELEPHONE (OUTPATIENT)
Age: 70
End: 2025-01-30

## 2025-01-30 DIAGNOSIS — R22.1 MASS OF LEFT SIDE OF NECK: Primary | ICD-10-CM

## 2025-01-30 NOTE — TELEPHONE ENCOUNTER
Called and lm to try and schedule pt with Dr. Stoddard    NP: Mass of left side of neck, dipti Faye

## 2025-02-12 ENCOUNTER — OFFICE VISIT (OUTPATIENT)
Age: 70
End: 2025-02-12
Payer: MEDICARE

## 2025-02-12 VITALS
BODY MASS INDEX: 25.62 KG/M2 | HEART RATE: 75 BPM | TEMPERATURE: 97.4 F | RESPIRATION RATE: 19 BRPM | DIASTOLIC BLOOD PRESSURE: 72 MMHG | WEIGHT: 193.34 LBS | SYSTOLIC BLOOD PRESSURE: 114 MMHG | HEIGHT: 73 IN | OXYGEN SATURATION: 98 %

## 2025-02-12 DIAGNOSIS — R22.1 MASS OF LEFT SIDE OF NECK: Primary | ICD-10-CM

## 2025-02-12 PROCEDURE — 3074F SYST BP LT 130 MM HG: CPT | Performed by: SURGERY

## 2025-02-12 PROCEDURE — 1126F AMNT PAIN NOTED NONE PRSNT: CPT | Performed by: SURGERY

## 2025-02-12 PROCEDURE — 1159F MED LIST DOCD IN RCRD: CPT | Performed by: SURGERY

## 2025-02-12 PROCEDURE — 1123F ACP DISCUSS/DSCN MKR DOCD: CPT | Performed by: SURGERY

## 2025-02-12 PROCEDURE — 3078F DIAST BP <80 MM HG: CPT | Performed by: SURGERY

## 2025-02-12 PROCEDURE — 99203 OFFICE O/P NEW LOW 30 MIN: CPT | Performed by: SURGERY

## 2025-02-12 PROCEDURE — 1160F RVW MEDS BY RX/DR IN RCRD: CPT | Performed by: SURGERY

## 2025-02-12 NOTE — H&P (VIEW-ONLY)
Surgery History and Physical    Subjective:      Victorino Short  is a 69 y.o.   male who presents with a left neck mass.  First noticed several months ago and has not increased in size since first noted.  Core biopsy was not diagnostic, showing some cells with a basaloid appearance. No oral lesions and no problems with his teeth. No weight loss, fevers or night sweats.    Past Medical History:   Diagnosis Date    Asthma 1958 or 59    Had asthma as a child. Ourgrew it when I was around 12. No issues since.    Basal cell carcinoma     Right Thigh    Cancer (HCC)     Mass of left side of neck 02/12/2025       Past Surgical History:   Procedure Laterality Date    APPENDECTOMY  1965    CARPAL TUNNEL RELEASE Right 09/2020    CARPAL TUNNEL RELEASE Left 2016    COLONOSCOPY  11/25/2015    normal    CYSTOSCOPY  2024    HEENT Left     opthalmoplegia - repairs    KNEE ARTHROSCOPY Right 1978    meniscus repair    ORTHOPEDIC SURGERY  2015    Trigger Finger - Left     OTHER SURGICAL HISTORY      Multiple eye surgery    US BIOPSY LYMPH NODE  1/20/2025    US BIOPSY LYMPH NODE 1/20/2025 [facility] RAD US       Social History     Tobacco Use    Smoking status: Never    Smokeless tobacco: Never   Substance Use Topics    Alcohol use: Yes     Alcohol/week: 7.0 standard drinks of alcohol       Family History   Problem Relation Age of Onset    Sleep Apnea Mother     Macular Degen Mother     Coronary Art Dis Father 66        CABGx4    Thyroid Disease Sister         ??    Sleep Apnea Brother     Sleep Apnea Brother     No Known Problems Daughter     No Known Problems Daughter     No Known Problems Son     No Known Problems Son     Hypertension Neg Hx     Diabetes Neg Hx     Cancer Neg Hx        Current Outpatient Medications on File Prior to Visit   Medication Sig Dispense Refill    atorvastatin (LIPITOR) 10 MG tablet TAKE 1 TABLET BY MOUTH EVERY DAY 90 tablet 3    meloxicam (MOBIC) 7.5 MG tablet Take 1 tablet by mouth daily as needed for

## 2025-02-12 NOTE — PROGRESS NOTES
Subjective:       Victorino Short presents to the clinic {numbers; 0-10:38483} weeks following ***. Eating a regular diet {WITH-WITHOUT:5700} difficulty. Bowel movements are {NORMAL / ABNORMAL (RESULT):81187::\"Normal\"}.  {PAIN CONTROL:73315::\"The patient is not having any pain.\"}.      Objective:      Ht 1.854 m (6' 1\")   BMI 25.52 kg/m²     General:  {gen appearance:73659::\"alert\",\"appears stated age\",\"cooperative\"}   Abdomen: {POST OP ABD EXAM:15465::\"soft\",\"bowel sounds active\",\"non-tender\"}   Incision:   {INCISION:84907::\"healing well\",\"no drainage\",\"no dehiscence\",\"no erythema\",\"no hernia\",\"no seroma\",\"incision well approximated\",\"no swelling\"}       Assessment:      {DOING WELL:36011::\"Doing well postoperatively.\"}      Plan:      1. Continue any current medications.  2. Wound care discussed.  3. {DISPOSITION:84517}  4. Follow up: {numbers; 0-10:13033} {units:11} for suture removal.

## 2025-02-12 NOTE — PROGRESS NOTES
Identified patient with two patient identifiers (name and ). Reviewed chart in preparation for visit and have obtained necessary documentation.    Victorino Short is a 69 y.o. male  Chief Complaint   Patient presents with    New Patient     Mass on left side of neck      /72 (Site: Left Upper Arm, Position: Sitting, Cuff Size: Large Adult)   Pulse 75   Temp 97.4 °F (36.3 °C) (Oral)   Resp 19   Ht 1.854 m (6' 1\")   Wt 87.7 kg (193 lb 5.5 oz)   SpO2 98%   BMI 25.51 kg/m²     1. Have you been to the ER, urgent care clinic since your last visit?  Hospitalized since your last visit?no    2. Have you seen or consulted any other health care providers outside of the [facility] since your last visit?  Include any pap smears or colon screening. no

## 2025-02-13 ENCOUNTER — TELEPHONE (OUTPATIENT)
Age: 70
End: 2025-02-13

## 2025-02-13 ENCOUNTER — PREP FOR PROCEDURE (OUTPATIENT)
Age: 70
End: 2025-02-13

## 2025-02-13 DIAGNOSIS — R22.1 NECK MASS: ICD-10-CM

## 2025-02-13 RX ORDER — SODIUM CHLORIDE 9 MG/ML
INJECTION, SOLUTION INTRAVENOUS PRN
Status: CANCELLED | OUTPATIENT
Start: 2025-02-13

## 2025-02-13 RX ORDER — SODIUM CHLORIDE 0.9 % (FLUSH) 0.9 %
5-40 SYRINGE (ML) INJECTION PRN
Status: CANCELLED | OUTPATIENT
Start: 2025-02-13

## 2025-02-13 RX ORDER — ACETAMINOPHEN 325 MG/1
1000 TABLET ORAL ONCE
Status: CANCELLED | OUTPATIENT
Start: 2025-02-13 | End: 2025-02-13

## 2025-02-13 RX ORDER — SODIUM CHLORIDE 0.9 % (FLUSH) 0.9 %
5-40 SYRINGE (ML) INJECTION EVERY 12 HOURS SCHEDULED
Status: CANCELLED | OUTPATIENT
Start: 2025-02-13

## 2025-02-13 NOTE — TELEPHONE ENCOUNTER
Patient scheduled for surgery on 2/25 and has questions. Patient wants to know if he can have black coffee the morning of surgery, if he will have stitches that need to be removed or if they are dissolvable. Patient wants to know if he is able to golf after having the surgery since he is traveling to [place] 2 days after his procedure.

## 2025-02-13 NOTE — TELEPHONE ENCOUNTER
Contacted patient to schedule surgery with Dr. Zamudio. Offered patient many dates and patient accepted 2/25. Notified patient of arrival time and stated that I will send a [facility] letter [facility] and also his home address. Patient thanked me for the call.

## 2025-02-13 NOTE — TELEPHONE ENCOUNTER
Returned call to patient.  Two patient identifiers used. I made patient aware his message was received and I made him aware of surgery letter addressing black coffee. Patient stated yes he knows he seen that on the letter. Patient stated he would be driving to [place] after surgery and would like to know if it is okay and play golf which is why he is going to [place]. I made Patient aware you at least have to wait 24 hours after general anesthesia to drive and also can not be on narcotics but I made him aware once provider come to office I will follow up with him. Patient verbalized understanding and thanked for update.

## 2025-02-19 ENCOUNTER — HOSPITAL ENCOUNTER (OUTPATIENT)
Facility: HOSPITAL | Age: 70
Discharge: HOME | End: 2025-02-22

## 2025-02-19 VITALS
BODY MASS INDEX: 24.74 KG/M2 | TEMPERATURE: 97.8 F | HEIGHT: 74 IN | HEART RATE: 73 BPM | WEIGHT: 192.8 LBS | SYSTOLIC BLOOD PRESSURE: 149 MMHG | RESPIRATION RATE: 18 BRPM | DIASTOLIC BLOOD PRESSURE: 75 MMHG

## 2025-02-19 NOTE — PERIOP NOTE
[address] [address]   MAIN OR                                  [phone]    MAIN PRE OP             [phone]                                                                                AMBULATORY PRE OP          [phone]  PRE-ADMISSION TESTING    [phone]     Surgery Date:  2/25/25       Is surgery arrival time given by surgeon?  YES  NO    If “NO”, [facility] staff will call you between 4 and 7pm the day before your surgery with your arrival time. (If your surgery is on a Monday, we will call you the Friday before.)    Call [phone] after 7pm Monday-Friday if you did not receive this call.    INSTRUCTIONS BEFORE YOUR SURGERY   When You  Arrive Arrive at [facility] Patient Access on 1st floor the day of your surgery.  Have your insurance card, photo ID,living will/advanced directive/POA (if applicable),  and any copayment (if needed)   Food   and   Drink NO solid food after midnight the night before surgery. You can drink clear liquids from midnight until ONE hour prior to your arrival at the hospital on the day of your surgery. Clear liquids include:  Water  Apple juice (no sediment)  Carbonated beverages  Black coffee(no cream/milk)  Tea(no cream/milk)  Gatorade    No alcohol (beer, wine, liquor) or marijuana (smoking) 24 hours, edibles (3 days). Stop smoking cigarettes 14 days before surgery (helps w/healing and breathing).   Medications to   TAKE   Morning of Surgery MEDICATIONS TO TAKE THE MORNING OF SURGERY WITH A SIP OF WATER: ATORVASTATIN    You may take these medications, IF NEEDED, the morning of surgery: NONE    Ask your surgeon/prescribing doctor for instructions on taking or stopping these medications prior to surgery: NONE   Medications to STOP  before surgery Non-Steroidal anti-inflammatory Drugs (NSAID's): for example, Diclofenac (Voltaren), Ibuprofen (Advil, Motrin), Naproxen (Aleve) 3 days    STOP all

## 2025-02-25 ENCOUNTER — ANESTHESIA (OUTPATIENT)
Facility: HOSPITAL | Age: 70
End: 2025-02-25
Payer: MEDICARE

## 2025-02-25 ENCOUNTER — ANESTHESIA EVENT (OUTPATIENT)
Facility: HOSPITAL | Age: 70
End: 2025-02-25
Payer: MEDICARE

## 2025-02-25 ENCOUNTER — HOSPITAL ENCOUNTER (OUTPATIENT)
Facility: HOSPITAL | Age: 70
Setting detail: OUTPATIENT SURGERY
Discharge: HOME | End: 2025-02-25
Attending: SURGERY | Admitting: SURGERY
Payer: MEDICARE

## 2025-02-25 VITALS
RESPIRATION RATE: 12 BRPM | SYSTOLIC BLOOD PRESSURE: 136 MMHG | HEART RATE: 63 BPM | TEMPERATURE: 97.7 F | DIASTOLIC BLOOD PRESSURE: 79 MMHG | OXYGEN SATURATION: 97 % | BODY MASS INDEX: 24.76 KG/M2 | HEIGHT: 74 IN | WEIGHT: 192.9 LBS

## 2025-02-25 DIAGNOSIS — G89.18 POST-OP PAIN: Primary | ICD-10-CM

## 2025-02-25 PROCEDURE — 6360000002 HC RX W HCPCS: Performed by: SURGERY

## 2025-02-25 PROCEDURE — 88341 IMHCHEM/IMCYTCHM EA ADD ANTB: CPT

## 2025-02-25 PROCEDURE — 88342 IMHCHEM/IMCYTCHM 1ST ANTB: CPT

## 2025-02-25 PROCEDURE — 2500000003 HC RX 250 WO HCPCS: Performed by: SURGERY

## 2025-02-25 PROCEDURE — 2500000003 HC RX 250 WO HCPCS: Performed by: NURSE ANESTHETIST, CERTIFIED REGISTERED

## 2025-02-25 PROCEDURE — 2720000010 HC SURG SUPPLY STERILE: Performed by: SURGERY

## 2025-02-25 PROCEDURE — 7100000000 HC PACU RECOVERY - FIRST 15 MIN: Performed by: SURGERY

## 2025-02-25 PROCEDURE — 7100000010 HC PHASE II RECOVERY - FIRST 15 MIN: Performed by: SURGERY

## 2025-02-25 PROCEDURE — 2580000003 HC RX 258: Performed by: ANESTHESIOLOGY

## 2025-02-25 PROCEDURE — 7100000011 HC PHASE II RECOVERY - ADDTL 15 MIN: Performed by: SURGERY

## 2025-02-25 PROCEDURE — 2709999900 HC NON-CHARGEABLE SUPPLY: Performed by: SURGERY

## 2025-02-25 PROCEDURE — 6370000000 HC RX 637 (ALT 250 FOR IP): Performed by: SURGERY

## 2025-02-25 PROCEDURE — 6370000000 HC RX 637 (ALT 250 FOR IP): Performed by: ANESTHESIOLOGY

## 2025-02-25 PROCEDURE — 3600000012 HC SURGERY LEVEL 2 ADDTL 15MIN: Performed by: SURGERY

## 2025-02-25 PROCEDURE — 7100000001 HC PACU RECOVERY - ADDTL 15 MIN: Performed by: SURGERY

## 2025-02-25 PROCEDURE — 3600000002 HC SURGERY LEVEL 2 BASE: Performed by: SURGERY

## 2025-02-25 PROCEDURE — 88305 TISSUE EXAM BY PATHOLOGIST: CPT

## 2025-02-25 PROCEDURE — 6360000002 HC RX W HCPCS: Performed by: NURSE ANESTHETIST, CERTIFIED REGISTERED

## 2025-02-25 PROCEDURE — 38510 BIOPSY/REMOVAL LYMPH NODES: CPT | Performed by: SURGERY

## 2025-02-25 PROCEDURE — 3700000001 HC ADD 15 MINUTES (ANESTHESIA): Performed by: SURGERY

## 2025-02-25 PROCEDURE — 3700000000 HC ANESTHESIA ATTENDED CARE: Performed by: SURGERY

## 2025-02-25 PROCEDURE — 2580000003 HC RX 258: Performed by: NURSE ANESTHETIST, CERTIFIED REGISTERED

## 2025-02-25 RX ORDER — SODIUM CHLORIDE 9 MG/ML
INJECTION, SOLUTION INTRAVENOUS PRN
Status: DISCONTINUED | OUTPATIENT
Start: 2025-02-25 | End: 2025-02-25 | Stop reason: HOSPADM

## 2025-02-25 RX ORDER — MIDAZOLAM HYDROCHLORIDE 2 MG/2ML
2 INJECTION, SOLUTION INTRAMUSCULAR; INTRAVENOUS PRN
Status: DISCONTINUED | OUTPATIENT
Start: 2025-02-25 | End: 2025-02-25 | Stop reason: HOSPADM

## 2025-02-25 RX ORDER — FENTANYL CITRATE 50 UG/ML
INJECTION, SOLUTION INTRAMUSCULAR; INTRAVENOUS
Status: DISCONTINUED | OUTPATIENT
Start: 2025-02-25 | End: 2025-02-25 | Stop reason: SDUPTHER

## 2025-02-25 RX ORDER — LIDOCAINE HYDROCHLORIDE 10 MG/ML
1 INJECTION, SOLUTION EPIDURAL; INFILTRATION; INTRACAUDAL; PERINEURAL
Status: DISCONTINUED | OUTPATIENT
Start: 2025-02-25 | End: 2025-02-25 | Stop reason: HOSPADM

## 2025-02-25 RX ORDER — OXYCODONE HYDROCHLORIDE 5 MG/1
5 TABLET ORAL
Status: COMPLETED | OUTPATIENT
Start: 2025-02-25 | End: 2025-02-25

## 2025-02-25 RX ORDER — ONDANSETRON 2 MG/ML
INJECTION INTRAMUSCULAR; INTRAVENOUS
Status: DISCONTINUED | OUTPATIENT
Start: 2025-02-25 | End: 2025-02-25 | Stop reason: SDUPTHER

## 2025-02-25 RX ORDER — LABETALOL HYDROCHLORIDE 5 MG/ML
10 INJECTION, SOLUTION INTRAVENOUS
Status: DISCONTINUED | OUTPATIENT
Start: 2025-02-25 | End: 2025-02-25 | Stop reason: HOSPADM

## 2025-02-25 RX ORDER — ACETAMINOPHEN 325 MG/1
650 TABLET ORAL ONCE
Status: DISCONTINUED | OUTPATIENT
Start: 2025-02-25 | End: 2025-02-25 | Stop reason: SDUPTHER

## 2025-02-25 RX ORDER — SODIUM CHLORIDE 0.9 % (FLUSH) 0.9 %
5-40 SYRINGE (ML) INJECTION PRN
Status: DISCONTINUED | OUTPATIENT
Start: 2025-02-25 | End: 2025-02-25 | Stop reason: HOSPADM

## 2025-02-25 RX ORDER — MIDAZOLAM HYDROCHLORIDE 2 MG/2ML
2 INJECTION, SOLUTION INTRAMUSCULAR; INTRAVENOUS
Status: DISCONTINUED | OUTPATIENT
Start: 2025-02-25 | End: 2025-02-25 | Stop reason: HOSPADM

## 2025-02-25 RX ORDER — ONDANSETRON 2 MG/ML
4 INJECTION INTRAMUSCULAR; INTRAVENOUS
Status: DISCONTINUED | OUTPATIENT
Start: 2025-02-25 | End: 2025-02-25 | Stop reason: HOSPADM

## 2025-02-25 RX ORDER — BUPIVACAINE HYDROCHLORIDE AND EPINEPHRINE 5; 5 MG/ML; UG/ML
INJECTION, SOLUTION EPIDURAL; INTRACAUDAL; PERINEURAL PRN
Status: DISCONTINUED | OUTPATIENT
Start: 2025-02-25 | End: 2025-02-25 | Stop reason: HOSPADM

## 2025-02-25 RX ORDER — FENTANYL CITRATE 50 UG/ML
25 INJECTION, SOLUTION INTRAMUSCULAR; INTRAVENOUS PRN
Status: DISCONTINUED | OUTPATIENT
Start: 2025-02-25 | End: 2025-02-25 | Stop reason: HOSPADM

## 2025-02-25 RX ORDER — FENTANYL CITRATE 50 UG/ML
50 INJECTION, SOLUTION INTRAMUSCULAR; INTRAVENOUS PRN
Status: DISCONTINUED | OUTPATIENT
Start: 2025-02-25 | End: 2025-02-25 | Stop reason: HOSPADM

## 2025-02-25 RX ORDER — PHENYLEPHRINE HCL IN 0.9% NACL 0.4MG/10ML
SYRINGE (ML) INTRAVENOUS
Status: DISCONTINUED | OUTPATIENT
Start: 2025-02-25 | End: 2025-02-25 | Stop reason: SDUPTHER

## 2025-02-25 RX ORDER — SODIUM CHLORIDE 0.9 % (FLUSH) 0.9 %
5-40 SYRINGE (ML) INJECTION EVERY 12 HOURS SCHEDULED
Status: DISCONTINUED | OUTPATIENT
Start: 2025-02-25 | End: 2025-02-25 | Stop reason: HOSPADM

## 2025-02-25 RX ORDER — DEXAMETHASONE SODIUM PHOSPHATE 4 MG/ML
INJECTION, SOLUTION INTRA-ARTICULAR; INTRALESIONAL; INTRAMUSCULAR; INTRAVENOUS; SOFT TISSUE
Status: DISCONTINUED | OUTPATIENT
Start: 2025-02-25 | End: 2025-02-25 | Stop reason: SDUPTHER

## 2025-02-25 RX ORDER — SODIUM CHLORIDE 9 MG/ML
INJECTION, SOLUTION INTRAVENOUS CONTINUOUS
Status: DISCONTINUED | OUTPATIENT
Start: 2025-02-25 | End: 2025-02-25 | Stop reason: HOSPADM

## 2025-02-25 RX ORDER — LIDOCAINE HYDROCHLORIDE 20 MG/ML
INJECTION, SOLUTION EPIDURAL; INFILTRATION; INTRACAUDAL; PERINEURAL
Status: DISCONTINUED | OUTPATIENT
Start: 2025-02-25 | End: 2025-02-25 | Stop reason: SDUPTHER

## 2025-02-25 RX ORDER — OXYCODONE AND ACETAMINOPHEN 5; 325 MG/1; MG/1
1 TABLET ORAL EVERY 6 HOURS PRN
Qty: 12 TABLET | Refills: 0 | Status: SHIPPED | OUTPATIENT
Start: 2025-02-25 | End: 2025-02-28

## 2025-02-25 RX ORDER — SODIUM CHLORIDE, SODIUM LACTATE, POTASSIUM CHLORIDE, CALCIUM CHLORIDE 600; 310; 30; 20 MG/100ML; MG/100ML; MG/100ML; MG/100ML
INJECTION, SOLUTION INTRAVENOUS
Status: DISCONTINUED | OUTPATIENT
Start: 2025-02-25 | End: 2025-02-25 | Stop reason: SDUPTHER

## 2025-02-25 RX ORDER — PROPOFOL 10 MG/ML
INJECTION, EMULSION INTRAVENOUS
Status: DISCONTINUED | OUTPATIENT
Start: 2025-02-25 | End: 2025-02-25 | Stop reason: SDUPTHER

## 2025-02-25 RX ORDER — SODIUM CHLORIDE, SODIUM LACTATE, POTASSIUM CHLORIDE, CALCIUM CHLORIDE 600; 310; 30; 20 MG/100ML; MG/100ML; MG/100ML; MG/100ML
INJECTION, SOLUTION INTRAVENOUS CONTINUOUS
Status: DISCONTINUED | OUTPATIENT
Start: 2025-02-25 | End: 2025-02-25 | Stop reason: HOSPADM

## 2025-02-25 RX ORDER — PROCHLORPERAZINE EDISYLATE 5 MG/ML
5 INJECTION INTRAMUSCULAR; INTRAVENOUS
Status: DISCONTINUED | OUTPATIENT
Start: 2025-02-25 | End: 2025-02-25 | Stop reason: HOSPADM

## 2025-02-25 RX ORDER — ACETAMINOPHEN 500 MG
1000 TABLET ORAL ONCE
Status: COMPLETED | OUTPATIENT
Start: 2025-02-25 | End: 2025-02-25

## 2025-02-25 RX ORDER — NALOXONE HYDROCHLORIDE 0.4 MG/ML
INJECTION, SOLUTION INTRAMUSCULAR; INTRAVENOUS; SUBCUTANEOUS PRN
Status: DISCONTINUED | OUTPATIENT
Start: 2025-02-25 | End: 2025-02-25 | Stop reason: HOSPADM

## 2025-02-25 RX ORDER — DIPHENHYDRAMINE HYDROCHLORIDE 50 MG/ML
12.5 INJECTION INTRAMUSCULAR; INTRAVENOUS
Status: DISCONTINUED | OUTPATIENT
Start: 2025-02-25 | End: 2025-02-25 | Stop reason: HOSPADM

## 2025-02-25 RX ORDER — FENTANYL CITRATE 50 UG/ML
25 INJECTION, SOLUTION INTRAMUSCULAR; INTRAVENOUS EVERY 5 MIN PRN
Status: DISCONTINUED | OUTPATIENT
Start: 2025-02-25 | End: 2025-02-25 | Stop reason: HOSPADM

## 2025-02-25 RX ORDER — MIDAZOLAM HYDROCHLORIDE 1 MG/ML
INJECTION, SOLUTION INTRAMUSCULAR; INTRAVENOUS
Status: DISCONTINUED | OUTPATIENT
Start: 2025-02-25 | End: 2025-02-25 | Stop reason: SDUPTHER

## 2025-02-25 RX ADMIN — Medication 80 MCG: at 12:14

## 2025-02-25 RX ADMIN — Medication 80 MCG: at 12:11

## 2025-02-25 RX ADMIN — LIDOCAINE HYDROCHLORIDE 100 MG: 20 INJECTION, SOLUTION EPIDURAL; INFILTRATION; INTRACAUDAL; PERINEURAL at 12:00

## 2025-02-25 RX ADMIN — FENTANYL CITRATE 50 MCG: 50 INJECTION INTRAMUSCULAR; INTRAVENOUS at 12:00

## 2025-02-25 RX ADMIN — FENTANYL CITRATE 25 MCG: 50 INJECTION INTRAMUSCULAR; INTRAVENOUS at 12:37

## 2025-02-25 RX ADMIN — FENTANYL CITRATE 25 MCG: 50 INJECTION INTRAMUSCULAR; INTRAVENOUS at 12:20

## 2025-02-25 RX ADMIN — WATER 2000 MG: 1 INJECTION INTRAMUSCULAR; INTRAVENOUS; SUBCUTANEOUS at 12:10

## 2025-02-25 RX ADMIN — Medication 25 MG: at 12:00

## 2025-02-25 RX ADMIN — ACETAMINOPHEN 1000 MG: 500 TABLET ORAL at 10:33

## 2025-02-25 RX ADMIN — PROPOFOL 200 MG: 10 INJECTION, EMULSION INTRAVENOUS at 12:00

## 2025-02-25 RX ADMIN — MIDAZOLAM HYDROCHLORIDE 3 MG: 1 INJECTION, SOLUTION INTRAMUSCULAR; INTRAVENOUS at 11:49

## 2025-02-25 RX ADMIN — ONDANSETRON 4 MG: 2 INJECTION INTRAMUSCULAR; INTRAVENOUS at 12:07

## 2025-02-25 RX ADMIN — DEXAMETHASONE SODIUM PHOSPHATE 4 MG: 4 INJECTION INTRA-ARTICULAR; INTRALESIONAL; INTRAMUSCULAR; INTRAVENOUS; SOFT TISSUE at 12:07

## 2025-02-25 RX ADMIN — SODIUM CHLORIDE, POTASSIUM CHLORIDE, SODIUM LACTATE AND CALCIUM CHLORIDE: 600; 310; 30; 20 INJECTION, SOLUTION INTRAVENOUS at 11:55

## 2025-02-25 RX ADMIN — SODIUM CHLORIDE, POTASSIUM CHLORIDE, SODIUM LACTATE AND CALCIUM CHLORIDE: 600; 310; 30; 20 INJECTION, SOLUTION INTRAVENOUS at 10:27

## 2025-02-25 RX ADMIN — MIDAZOLAM HYDROCHLORIDE 2 MG: 1 INJECTION, SOLUTION INTRAMUSCULAR; INTRAVENOUS at 11:55

## 2025-02-25 RX ADMIN — OXYCODONE HYDROCHLORIDE 5 MG: 5 TABLET ORAL at 15:41

## 2025-02-25 ASSESSMENT — PAIN - FUNCTIONAL ASSESSMENT: PAIN_FUNCTIONAL_ASSESSMENT: 0-10

## 2025-02-25 NOTE — BRIEF OP NOTE
Brief Postoperative Note      Patient: Victorino Short  YOB: 1955  MRN: [id number]    Date of Procedure: 2/25/2025    Pre-Op Diagnosis Codes:      * Neck mass [R22.1]    Post-Op Diagnosis: Same       Procedure(s):  EXCISION OF LEFT NECK MASS    Surgeon(s):  Russell Zamudio MD    Assistant:  Surgical Assistant: Bessy Pizano    Anesthesia: general    Estimated Blood Loss (mL): Minimal    Complications: None    Specimens:   ID Type Source Tests Collected by Time Destination   1 : left neck mass Tissue Neck SURGICAL PATHOLOGY Russell Zamudio MD 2/25/2025 1239        Implants:  * No implants in log *      Drains: * No LDAs found *    Findings:  Infection Present At Time Of Surgery (PATOS) (choose all levels that have infection present):  No infection present  Other Findings: 4 cm mass  This procedure was not performed to treat primary cutaneous melanoma through wide local excision    Electronically signed by Russell Zamudio MD on 2/25/2025 at 12:55 PM    [id number]

## 2025-02-25 NOTE — INTERVAL H&P NOTE
Update History & Physical    The patient's History and Physical of February 12, 2025 was reviewed with the patient and I examined the patient. There was no change. The surgical site was confirmed by the patient and me.     Plan: The risks, benefits, expected outcome, and alternative to the recommended procedure have been discussed with the patient. Patient understands and wants to proceed with the procedure.     Electronically signed by Russell Zamudio MD on 2/25/2025 at 11:28 AM

## 2025-02-25 NOTE — ANESTHESIA POSTPROCEDURE EVALUATION
Post-Anesthesia Evaluation and Assessment    Patient: Victorino Short MRN: [id number]  SSN: [id number]    YOB: 1955  Age: 69 y.o.  Sex: male      I have evaluated the patient and they are stable and ready for discharge from the PACU.     Cardiovascular Function/Vital Signs  Visit Vitals  /79   Pulse 63   Temp 97.7 °F (36.5 °C) (Axillary)   Resp 18   Ht 1.88 m (6' 2\")   Wt 87.5 kg (192 lb 14.4 oz)   SpO2 97%   BMI 24.77 kg/m²       Patient is status post Monitor Anesthesia Care anesthesia for Procedure(s):  EXCISION OF LEFT NECK MASS.    Nausea/Vomiting: None    Postoperative hydration reviewed and adequate.    Pain:      Managed    Neurological Status:       At baseline    Mental Status, Level of Consciousness: Alert and  oriented to person, place, and time    Pulmonary Status:       Adequate oxygenation and airway patent    Complications related to anesthesia: None    Post-anesthesia assessment completed. No concerns    Signed By: Sanjay Sánchez MD     February 25, 2025

## 2025-02-25 NOTE — DISCHARGE INSTRUCTIONS
______________________________________________________________________    Anesthesia Discharge Instructions    After general anesthesia or intervenous sedation, for 24 hours or while taking prescription Narcotics:  Limit your activities  Do not drive or operate hazardous machinery  If you have not urinated within 8 hours after discharge, please contact your surgeon on call.  Do not make important personal or business decisions  Do not drink alcoholic beverages    Report the following to your surgeon:  Excessive pain, swelling, redness or odor of or around the surgical area  Temperature over 100.5 degrees  Nausea and vomiting lasting longer than 4 hours or if unable to take medication  Any signs of decreased circulation or nerve impairment to extremity:  Change in color, persistent numbness, tingling, coldness or increased pain.  Any questions    Patient Discharge Instructions    Victorino Short / [id number] : 1955    Admitted 2025 Discharged: 2025     Take Home Medications            It is important that you take the medication exactly as they are prescribed.   Keep your medication in the bottles provided by the pharmacist and keep a list of the medication names, dosages, and times to be taken in your wallet.   Do not take other medications without consulting your doctor.       What to do at Home    Recommended diet: regular diet,     Recommended activity: activity as tolerated, may shower whenever you wish                  Information obtained by :  I understand that if any problems occur once I am at home I am to contact my physician.    I understand and acknowledge receipt of the instructions indicated above.                                                                                                                                           Physician's or R.N.'s Signature                                                                  Date/Time

## 2025-02-25 NOTE — ANESTHESIA PRE PROCEDURE
Department of Anesthesiology  Preprocedure Note       Name:  Victorino Short   Age:  69 y.o.  :  1955                                          MRN:  [id number]         Date:  2025      Surgeon: Surgeon(s):  Russell Zamudio MD    Procedure: Procedure(s):  EXCISION OF LEFT NECK MASS    Medications prior to admission:   Prior to Admission medications    Medication Sig Start Date End Date Taking? Authorizing Provider   atorvastatin (LIPITOR) 10 MG tablet TAKE 1 TABLET BY MOUTH EVERY DAY 25  Yes Juliocesar Faye MD       Current medications:    Current Facility-Administered Medications   Medication Dose Route Frequency Provider Last Rate Last Admin   • lidocaine PF 1 % injection 1 mL  1 mL IntraDERmal Once PRN Sanjay Sánchez MD       • fentaNYL (SUBLIMAZE) injection 25 mcg  25 mcg IntraVENous PRN Sanjay Sánchez MD        Or   • fentaNYL (SUBLIMAZE) injection 50 mcg  50 mcg IntraVENous PRN Sanjay Sánchez MD       • 0.9 % sodium chloride infusion   IntraVENous Continuous Sanjay Sánchez MD       • lactated ringers infusion   IntraVENous Continuous Sanjay Sánchez MD       • sodium chloride flush 0.9 % injection 5-40 mL  5-40 mL IntraVENous 2 times per day Sanjay Sánchez MD       • sodium chloride flush 0.9 % injection 5-40 mL  5-40 mL IntraVENous PRN Sanjay Sánchez MD       • 0.9 % sodium chloride infusion   IntraVENous PRN Sanjay Sánchez MD       • midazolam PF (VERSED) injection 2 mg  2 mg IntraVENous PRN Sanjay Sánchez MD       • acetaminophen (TYLENOL) tablet 1,000 mg  1,000 mg Oral Once Russell Zamudio MD       • sodium chloride flush 0.9 % injection 5-40 mL  5-40 mL IntraVENous 2 times per day Russell Zamudio MD       • sodium chloride flush 0.9 % injection 5-40 mL  5-40 mL IntraVENous PRN Russell Zamudio MD       • 0.9 % sodium chloride infusion   IntraVENous PRN Russell Zamudio MD       • ceFAZolin (ANCEF) 2,000 mg in sterile water 20 mL IV syringe  2,000 mg

## 2025-02-26 NOTE — OP NOTE
[address]  [address]                            OPERATIVE REPORT      PATIENT NAME: THOMAS DRIVER               : 1955  MED REC NO: [id number]                       ROOM: OR  ACCOUNT NO: [id number]                       ADMIT DATE: 2025  PROVIDER: Russell Zamudio MD    DATE OF SERVICE:  2025    PREOPERATIVE DIAGNOSES:  Left neck mass.    POSTOPERATIVE DIAGNOSES:  Left neck mass.    PROCEDURES PERFORMED:  Excision of left neck mass.    SURGEON:  Russell Zamudio MD    ASSISTANT:  Bessy Pizano.    ANESTHESIA:  General supplemented with 0.5% Marcaine with epinephrine.    ESTIMATED BLOOD LOSS:  Minimal.    SPECIMENS REMOVED:  Left neck mass.    INTRAOPERATIVE FINDINGS:  No infection present.  The mass was about 4 cm in size.     COMPLICATIONS:  None.    IMPLANTS:  None.    INDICATIONS:  Left neck mass.    DESCRIPTION OF PROCEDURE:  With the patient supine and suitably anesthetized, the left neck was prepared with ChloraPrep and draped as a field, 0.5% Marcaine with epinephrine was infiltrated in the skin and an oblique incision was made below the lesion to allow for elevation of the platysma and to avoid any injury to the mandibular branch of the facial nerve.  The lesion was then dissected by a combination of sharp and blunt dissection.  It was not attached to the parotid glands nor to any of the salivary glands that I could see.  It was medial to the jugular vein and superior to the stylohyoid and other musculature.  I was able to remove it in toto.  Little bleeders were coagulated.  A small piece of Surgicel SNoW was placed into the wound.  The platysma was reapproximated with interrupted Vicryl sutures and the skin was closed with subcuticular Monocryl followed by Dermabond.  At the termination of the procedure, all counts were correct.  The patient tolerated this well, was brought to the PACU in satisfactory

## 2025-02-27 ENCOUNTER — TELEPHONE (OUTPATIENT)
Age: 70
End: 2025-02-27

## 2025-02-27 NOTE — TELEPHONE ENCOUNTER
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 Will refer to Dr. Liu for further care.  Discussed in general terms the need to fully stage the disease to then be able to come up with a logical plan of care.  I sent a Perfect [employer] message to Dr. Liu.

## 2025-03-02 PROBLEM — D09.9 SQUAMOUS CELL CARCINOMA IN SITU: Status: ACTIVE | Noted: 2025-03-02

## 2025-03-05 ENCOUNTER — TELEPHONE (OUTPATIENT)
Age: 70
End: 2025-03-05

## 2025-03-05 NOTE — TELEPHONE ENCOUNTER
Ember called from [facility] requesting patient medical records (biopsy, scans, and recent office notes). Dr. Zamudio referred patient to see Dr. Liu. Fax # [phone] to the attention of Ember.

## 2025-03-05 NOTE — TELEPHONE ENCOUNTER
Marino for [facility] [phone]   Called to request that notes be faxed to them for this patient.  He was in today for a surgical consult and they had never received anything.    Fax: [phone]

## 2025-03-05 NOTE — TELEPHONE ENCOUNTER
I attempted to call Ember back, I spoke with Checo. 2 patient identifiers used. I made him aware I was calling to speak with Ember. Checo stated which one we have 3. I made him aware I am not sure as a last name was not given, he looked in chart and stated Emberfili Mckinley called, he asked what was my question. I made him aware I was calling. to make her aware I can fax off notes from our office but other documentation she would need to reach out to patient PCP and would have to send a formal imaging request to our radiology file room for images. He stated Ember was in clinic and was not going to bother her but he will take down the message to make her aware. Radiology file room fax number given and PCP office number.

## 2025-03-05 NOTE — TELEPHONE ENCOUNTER
Was referred by surgeon.  Please send:   1. Surgery report (Feb)   2. Pathology report from surgery.   3. My most recent note

## 2025-03-07 ENCOUNTER — TRANSCRIBE ORDERS (OUTPATIENT)
Facility: HOSPITAL | Age: 70
End: 2025-03-07

## 2025-03-07 ENCOUNTER — HOSPITAL ENCOUNTER (OUTPATIENT)
Facility: HOSPITAL | Age: 70
Discharge: HOME | End: 2025-03-10
Attending: OTOLARYNGOLOGY
Payer: MEDICARE

## 2025-03-07 DIAGNOSIS — C79.89 PERICARDITIS SECONDARY TO TUMOR METASTATIC TO PERICARDIUM (HCC): Primary | ICD-10-CM

## 2025-03-07 DIAGNOSIS — I32 PERICARDITIS SECONDARY TO TUMOR METASTATIC TO PERICARDIUM (HCC): Primary | ICD-10-CM

## 2025-03-07 DIAGNOSIS — C79.89 PERICARDITIS SECONDARY TO TUMOR METASTATIC TO PERICARDIUM (HCC): ICD-10-CM

## 2025-03-07 DIAGNOSIS — I32 PERICARDITIS SECONDARY TO TUMOR METASTATIC TO PERICARDIUM (HCC): ICD-10-CM

## 2025-03-07 PROCEDURE — 6360000004 HC RX CONTRAST MEDICATION: Performed by: OTOLARYNGOLOGY

## 2025-03-07 PROCEDURE — 70491 CT SOFT TISSUE NECK W/DYE: CPT

## 2025-03-07 RX ORDER — IOPAMIDOL 755 MG/ML
100 INJECTION, SOLUTION INTRAVASCULAR
Status: COMPLETED | OUTPATIENT
Start: 2025-03-07 | End: 2025-03-07

## 2025-03-07 RX ADMIN — IOPAMIDOL 100 ML: 755 INJECTION, SOLUTION INTRAVENOUS at 14:34

## 2025-03-10 ENCOUNTER — TELEPHONE (OUTPATIENT)
Age: 70
End: 2025-03-10

## 2025-03-10 NOTE — TELEPHONE ENCOUNTER
I called pathology 2 patient identifiers used. I made staff aware I was calling to check status on PD-L1 results, staff placed me on hold to see what will be turn around time.     Staff returned to call and stated results has been resulted last week, she stated to look under media tab.     I will make provider aware.

## 2025-03-12 ENCOUNTER — OFFICE VISIT (OUTPATIENT)
Age: 70
End: 2025-03-12

## 2025-03-12 VITALS
DIASTOLIC BLOOD PRESSURE: 69 MMHG | OXYGEN SATURATION: 97 % | RESPIRATION RATE: 14 BRPM | BODY MASS INDEX: 24.67 KG/M2 | WEIGHT: 192.2 LBS | HEIGHT: 74 IN | SYSTOLIC BLOOD PRESSURE: 124 MMHG | HEART RATE: 64 BPM | TEMPERATURE: 97.6 F

## 2025-03-12 DIAGNOSIS — C77.0 METASTASIS TO CERVICAL LYMPH NODE (HCC): Primary | ICD-10-CM

## 2025-03-12 PROCEDURE — 99024 POSTOP FOLLOW-UP VISIT: CPT | Performed by: SURGERY

## 2025-03-12 NOTE — PROGRESS NOTES
Identified patient with two patient identifiers (name and ). Reviewed chart in preparation for visit and have obtained necessary documentation.    Victorino Short is a 69 y.o. male  Chief Complaint   Patient presents with    Post-Op Check     2 weeks s/p EXCISION OF LEFT NECK MASS     /69 (BP Site: Left Upper Arm, Patient Position: Sitting, BP Cuff Size: Large Adult)   Pulse 64   Temp 97.6 °F (36.4 °C) (Oral)   Resp 14   Ht 1.88 m (6' 2\")   Wt 87.2 kg (192 lb 3.2 oz)   SpO2 97%   BMI 24.68 kg/m²     1. Have you been to the ER, urgent care clinic since your last visit?  Hospitalized since your last visit?no    2. Have you seen or consulted any other health care providers outside of the [facility] since your last visit?  Include any pap smears or colon screening. no

## 2025-03-12 NOTE — PROGRESS NOTES
Subjective:       Victorino Short presents to the clinic 2 weeks following excision of a left cervical lymph node found to have metastatic squamous cell carcinoma. No obvious site of a primary..    Objective:      /69 (BP Site: Left Upper Arm, Patient Position: Sitting, BP Cuff Size: Large Adult)   Pulse 64   Temp 97.6 °F (36.4 °C) (Oral)   Resp 14   Ht 1.88 m (6' 2\")   Wt 87.2 kg (192 lb 3.2 oz)   SpO2 97%   BMI 24.68 kg/m²     General:  alert, appears stated age, and cooperative       Incision:   healing well, no drainage, no erythema, no seroma, no dehiscence, incision well approximated       Assessment:      Doing well postoperatively.      Plan:      1. Continue any current medications.  2. Wound care discussed.    3. I spent several minutes reassuring him about the workup and some of the possible treatments which will be dictated by the final staging assessment. Told him I would be available if needed, and reassured him he was in very good hands with Dr. Liu.

## 2025-03-22 ENCOUNTER — HOSPITAL ENCOUNTER (OUTPATIENT)
Facility: HOSPITAL | Age: 70
Discharge: HOME | End: 2025-03-25
Payer: MEDICARE

## 2025-03-22 DIAGNOSIS — I32 PERICARDITIS SECONDARY TO TUMOR METASTATIC TO PERICARDIUM (HCC): ICD-10-CM

## 2025-03-22 DIAGNOSIS — C79.89 PERICARDITIS SECONDARY TO TUMOR METASTATIC TO PERICARDIUM (HCC): ICD-10-CM

## 2025-03-22 PROCEDURE — A9609 HC RX DIAGNOSTIC RADIOPHARMACEUTICAL: HCPCS | Performed by: OTOLARYNGOLOGY

## 2025-03-22 PROCEDURE — 78815 PET IMAGE W/CT SKULL-THIGH: CPT

## 2025-03-22 PROCEDURE — 3430000000 HC RX DIAGNOSTIC RADIOPHARMACEUTICAL: Performed by: OTOLARYNGOLOGY

## 2025-03-22 RX ORDER — FLUDEOXYGLUCOSE F-18 500 MCI/ML
11.34 INJECTION INTRAVENOUS
Status: COMPLETED | OUTPATIENT
Start: 2025-03-22 | End: 2025-03-22

## 2025-03-22 RX ADMIN — FLUDEOXYGLUCOSE F-18 11.34 MILLICURIE: 500 INJECTION INTRAVENOUS at 12:08

## 2025-08-12 ENCOUNTER — TELEPHONE (OUTPATIENT)
Age: 70
End: 2025-08-12

## 2025-08-13 ENCOUNTER — TELEPHONE (OUTPATIENT)
Age: 70
End: 2025-08-13

## 2025-08-13 ENCOUNTER — OFFICE VISIT (OUTPATIENT)
Age: 70
End: 2025-08-13
Payer: MEDICARE

## 2025-08-13 ENCOUNTER — HOSPITAL ENCOUNTER (OUTPATIENT)
Dept: VASCULAR SURGERY | Facility: HOSPITAL | Age: 70
Discharge: HOME | End: 2025-08-15
Payer: MEDICARE

## 2025-08-13 VITALS
SYSTOLIC BLOOD PRESSURE: 138 MMHG | OXYGEN SATURATION: 98 % | DIASTOLIC BLOOD PRESSURE: 75 MMHG | RESPIRATION RATE: 16 BRPM | WEIGHT: 189.6 LBS | TEMPERATURE: 97.7 F | HEART RATE: 63 BPM | BODY MASS INDEX: 24.34 KG/M2

## 2025-08-13 DIAGNOSIS — R22.32 LOCALIZED SWELLING, MASS, OR LUMP OF LEFT UPPER EXTREMITY: Primary | ICD-10-CM

## 2025-08-13 DIAGNOSIS — R22.32 LOCALIZED SWELLING, MASS, OR LUMP OF LEFT UPPER EXTREMITY: ICD-10-CM

## 2025-08-13 PROCEDURE — 3075F SYST BP GE 130 - 139MM HG: CPT | Performed by: NURSE PRACTITIONER

## 2025-08-13 PROCEDURE — 1159F MED LIST DOCD IN RCRD: CPT | Performed by: NURSE PRACTITIONER

## 2025-08-13 PROCEDURE — 99213 OFFICE O/P EST LOW 20 MIN: CPT | Performed by: NURSE PRACTITIONER

## 2025-08-13 PROCEDURE — 93971 EXTREMITY STUDY: CPT

## 2025-08-13 PROCEDURE — 1123F ACP DISCUSS/DSCN MKR DOCD: CPT | Performed by: NURSE PRACTITIONER

## 2025-08-13 PROCEDURE — 3078F DIAST BP <80 MM HG: CPT | Performed by: NURSE PRACTITIONER

## 2025-08-13 PROCEDURE — 1126F AMNT PAIN NOTED NONE PRSNT: CPT | Performed by: NURSE PRACTITIONER

## (undated) DEVICE — SOLUTION IRRIG 1000ML 09% SOD CHL USP PIC PLAS CONTAINER

## (undated) DEVICE — MINOR BASIN -SMH: Brand: MEDLINE INDUSTRIES, INC.

## (undated) DEVICE — GARMENT,MEDLINE,DVT,INT,CALF,MED, GEN2: Brand: MEDLINE

## (undated) DEVICE — X-RAY DETECTABLE SPONGES,16 PLY: Brand: VISTEC

## (undated) DEVICE — AGENT HEMSTAT W4XL4IN OXIDIZED REGENERATED CELOS STRUCTURED

## (undated) DEVICE — DRAPE,UTILTY,TAPE,15X26, 4EA/PK: Brand: MEDLINE

## (undated) DEVICE — INTENT OT USE PROVIDES A STERILE INTERFACE BETWEEN THE OPERATING ROOM SURGICAL LAMPS (NON-STERILE) AND THE SURGEON OR STAFF WORKING IN THE STERILE FIELD.: Brand: ASPEN® ALC PLUS LIGHT HANDLE COVER

## (undated) DEVICE — SUTURE MONOCRYL + SZ 4-0 L27IN ABSRB UD L19MM PS-2 3/8 CIR MCP426H

## (undated) DEVICE — SKIN PREP TRAY 4 COMPARTM TRAY: Brand: MEDLINE INDUSTRIES, INC.

## (undated) DEVICE — GLOVE ORANGE PI 7 1/2   MSG9075

## (undated) DEVICE — ELECTRODE PT RET AD L9FT HI MOIST COND ADH HYDRGEL CORDED

## (undated) DEVICE — SOLUTION SCRB 4OZ 4% CHG H2O AIDED FOR PREOPERATIVE SKIN

## (undated) DEVICE — LIQUIBAND RAPID ADHESIVE 36/CS 0.8ML: Brand: MEDLINE

## (undated) DEVICE — DRAPE,LAPAROTOMY,T,PEDI,STERILE: Brand: MEDLINE